# Patient Record
Sex: MALE | Race: WHITE | Employment: OTHER | URBAN - METROPOLITAN AREA
[De-identification: names, ages, dates, MRNs, and addresses within clinical notes are randomized per-mention and may not be internally consistent; named-entity substitution may affect disease eponyms.]

---

## 2017-11-02 ENCOUNTER — HOSPITAL ENCOUNTER (OUTPATIENT)
Dept: MRI IMAGING | Age: 72
Discharge: HOME | End: 2017-11-02
Attending: UROLOGY
Payer: MEDICARE

## 2017-11-02 VITALS — BODY MASS INDEX: 22.89 KG/M2 | WEIGHT: 155 LBS

## 2017-11-02 DIAGNOSIS — R97.20 ELEVATED PROSTATE SPECIFIC ANTIGEN (PSA): ICD-10-CM

## 2017-11-02 LAB — CREAT BLD-MCNC: 0.8 MG/DL (ref 0.8–1.5)

## 2017-11-02 PROCEDURE — 74011000258 HC RX REV CODE- 258: Performed by: UROLOGY

## 2017-11-02 PROCEDURE — A9577 INJ MULTIHANCE: HCPCS | Performed by: UROLOGY

## 2017-11-02 PROCEDURE — 82565 ASSAY OF CREATININE: CPT

## 2017-11-02 PROCEDURE — 72197 MRI PELVIS W/O & W/DYE: CPT

## 2017-11-02 PROCEDURE — 74011250636 HC RX REV CODE- 250/636: Performed by: UROLOGY

## 2017-11-02 RX ORDER — SODIUM CHLORIDE 0.9 % (FLUSH) 0.9 %
10 SYRINGE (ML) INJECTION
Status: COMPLETED | OUTPATIENT
Start: 2017-11-02 | End: 2017-11-02

## 2017-11-02 RX ADMIN — SODIUM CHLORIDE 100 ML: 900 INJECTION, SOLUTION INTRAVENOUS at 09:39

## 2017-11-02 RX ADMIN — GADOBENATE DIMEGLUMINE 10 ML: 529 INJECTION, SOLUTION INTRAVENOUS at 09:39

## 2017-11-02 RX ADMIN — Medication 10 ML: at 09:39

## 2018-02-27 PROBLEM — H81.10 BENIGN PAROXYSMAL VERTIGO: Status: ACTIVE | Noted: 2018-02-27

## 2018-02-27 PROBLEM — R42 DIZZINESS: Status: ACTIVE | Noted: 2018-02-27

## 2019-12-30 NOTE — H&P (VIEW-ONLY)
[address] [facility], [address] [facility]  [phone]    Patient Name:  Ernesto Quintanilla  YOB: 1945      Office Visit 12/30/2019    CHIEF COMPLAINT:    No chief complaint on file. HISTORY OF PRESENT ILLNESS:    Very pleasant 50-year-old gentleman referred to us for evaluation of chronic coughing as well as abnormal chest CT, patient states that he had a general work-up which revealed some abnormalities on a chest x-ray which then prompted a CT scan of the chest revealing chronic fibrotic changes in the lower lobes left more than right and bronchiectasis hence this referral.  Patient states he has lost about 4 pounds in the past year, he has a good appetite, denies any night sweats, fevers or chills. He tells me that in the 70s he had a very severe pneumonia and it was associated with hemoptysis at the time and then another pneumonia approximately 2 years ago. He denies any history of rheumatoid arthritis, chronic recurrent infections. He states that his cough is occasionally productive of sputum but usually dry. He also states that occasionally he will choke on food and drink. He does not use mineral oil and does not have a history of constipation.   Otherwise he has benign prostatic hypertrophy and no other chronic medical problems he used to smoke in the past but very briefly      Past Medical History:   Diagnosis Date    BPH (benign prostatic hypertrophy) 5/23/2014    DJD (degenerative joint disease) 5/23/2014    ED (erectile dysfunction) 5/23/2014    Elevated prostate specific antigen (PSA) 12/11/2013    Glaucoma     Osteoporosis          Problem List  Date Reviewed: 4/2/2019          Codes Class Noted    Benign paroxysmal vertigo ICD-10-CM: H81.10  ICD-9-CM: 386.11  2/27/2018        Dizziness ICD-10-CM: R42  ICD-9-CM: 780.4  2/27/2018        Sciatica of left side ICD-10-CM: M54.32  ICD-9-CM: 724.3  11/2/2016        Elevated BP ICD-10-CM: R03.0  ICD-9-CM: [address]  2016        Left hip pain ICD-10-CM: M25.552  ICD-9-CM: 719.45  10/12/2016        Chronic pain of both knees ICD-10-CM: M25.561, M25.562, G89.29  ICD-9-CM: 719.46, 338.29  10/12/2016        Essential hypertension ICD-10-CM: I10  ICD-9-CM: 401.9  10/12/2016        ED (erectile dysfunction) ICD-10-CM: N52.9  ICD-9-CM: 607.84  2014        DJD (degenerative joint disease) ICD-10-CM: M19.90  ICD-9-CM: 715.90  2014        Benign prostatic hyperplasia ICD-10-CM: N40.0  ICD-9-CM: 600.00  2014        Osteoporosis ICD-10-CM: M81.0  ICD-9-CM: 733.00  Unknown        Elevated prostate specific antigen (PSA) ICD-10-CM: R97.20  ICD-9-CM: 790.93  2013                Past Surgical History:   Procedure Laterality Date    HX OTHER SURGICAL      EXPLORATORY SCROTAL SURGERY          Social History     Socioeconomic History    Marital status:      Spouse name: Not on file    Number of children: Not on file    Years of education: Not on file    Highest education level: Not on file   Occupational History    Not on file   Social Needs    Financial resource strain: Not on file    Food insecurity:     Worry: Not on file     Inability: Not on file    Transportation needs:     Medical: Not on file     Non-medical: Not on file   Tobacco Use    Smoking status: Former Smoker     Packs/day: 0.50     Years: 7.00     Pack years: 3.50     Types: Cigarettes     Last attempt to quit: 1970     Years since quittin.0    Smokeless tobacco: Never Used   Substance and Sexual Activity    Alcohol use: No     Alcohol/week: 0.8 standard drinks     Types: 1 Glasses of wine per week     Comment: occasssionally    Drug use: No    Sexual activity: Not on file   Lifestyle    Physical activity:     Days per week: Not on file     Minutes per session: Not on file    Stress: Not on file   Relationships    Social connections:     Talks on phone: Not on file     Gets together: Not on file     Attends Latter-day service: Not on file     Active member of club or organization: Not on file     Attends meetings of clubs or organizations: Not on file     Relationship status: Not on file    Intimate partner violence:     Fear of current or ex partner: Not on file     Emotionally abused: Not on file     Physically abused: Not on file     Forced sexual activity: Not on file   Other Topics Concern    Not on file   Social History Narrative    Not on file         Family History   Problem Relation Age of Onset    Hypertension Mother          Allergies   Allergen Reactions    Duratuss [Pseudoephedrine-Guaifenesin] Unknown (comments)    Erythromycin Unknown (comments)         Current Outpatient Medications   Medication Sig    finasteride (PROPECIA) 1 mg tablet Take 1 mg by mouth daily.  tamsulosin (FLOMAX) 0.4 mg capsule Take 0.4 mg by mouth daily.  latanoprost (XALATAN) 0.005 % ophthalmic solution     travoprost (TRAVATAN Z) 0.004 % ophthalmic solution Administer 1 Drop to both eyes every evening.  cephALEXin (KEFLEX) 500 mg capsule Take 1 Cap by mouth three (3) times daily.  clotrimazole-betamethasone (LOTRISONE) topical cream Apply  to affected area two (2) times a day. For about 4-6 weeks    sildenafil citrate (VIAGRA) 100 mg tablet Take 1 Tab by mouth as needed. As needed for ED. No current facility-administered medications for this visit. Review of Systems   Constitutional: Positive for weight loss. Negative for chills, diaphoresis, fever and malaise/fatigue. HENT: Negative for congestion, ear discharge, ear pain, hearing loss, nosebleeds, sinus pain, sore throat and tinnitus. Eyes: Negative for blurred vision, pain and redness. Respiratory: Negative for cough, hemoptysis, sputum production, shortness of breath and wheezing. Cardiovascular: Negative for chest pain, palpitations, orthopnea, leg swelling and PND.    Gastrointestinal: Negative for abdominal pain, blood in stool, constipation, diarrhea, heartburn, melena, nausea and vomiting. Genitourinary: Positive for frequency. Negative for dysuria, flank pain, hematuria and urgency. Musculoskeletal: Positive for back pain. Negative for joint pain and neck pain. Skin: Negative for itching and rash. Neurological: Negative for dizziness, tremors, focal weakness, seizures and headaches. Endo/Heme/Allergies: Positive for environmental allergies. Bruises/bleeds easily. Psychiatric/Behavioral: Negative for depression, hallucinations, memory loss, substance abuse and suicidal ideas. The patient is not nervous/anxious. PHYSICAL EXAM:    Visit Vitals  /62 (BP 1 Location: Right arm, BP Patient Position: Sitting)   Pulse 76   Resp 14   Ht 5' 9\" (1.753 m)   Wt 139 lb (63 kg)   SpO2 100%   BMI 20.53 kg/m²        GENERAL APPEARANCE:   The patient is thin and in no respiratory distress. HEENT:   PERRL. Conjunctivae unremarkable. Nasal mucosa is without epistaxis, exudate, or polyps. Gums and dentition are unremarkable. There is no oropharyngeal narrowing. TMs are clear. NECK/LYMPHATIC:   Symmetrical with no elevation of jugular venous pulsation. Trachea midline. No thyroid enlargement. No cervical adenopathy. LUNGS:   Normal respiratory effort with symmetrical lung expansion. Breath sounds clear to auscultation bilaterally with no rhonchi wheezing or crackles. HEART:   There is a regular rate and rhythm. No murmur, rub, or gallop. There is no edema in the lower extremities. ABDOMEN:   Soft and non-tender. No hepatosplenomegaly. Bowel sounds are normal.     SKIN:   There are no rashes, cyanosis, jaundice, or ecchymosis present. EXTREMITIES:   The extremities are unremarkable without clubbing, cyanosis, joint inflammation, degenerative, or ischemic change. MUSCULOSKELETAL:   There is no abnormal tone, muscle atrophy, or abnormal movement present.    NEURO:   The patient is alert and oriented to person, place, and time. Memory appears intact and mood is normal.  No gross sensorimotor deficits are present. DIAGNOSTIC TESTS:   CT of chest:            Spirometry:  12/30/2019           ASSESSMENT:  (Medical Decision Making)       Patient Active Problem List   Diagnosis Code    Elevated prostate specific antigen (PSA) R97.20    ED (erectile dysfunction) N52.9    DJD (degenerative joint disease) M19.90    Benign prostatic hyperplasia N40.0    Osteoporosis M81.0    Left hip pain M25.552    Chronic pain of both knees M25.561, M25.562, G89.29    Essential hypertension I10    Sciatica of left side M54.32    Elevated BP R03.0    Benign paroxysmal vertigo H81.10    Dizziness R42           PLAN:  Encounter Diagnosis   Name Primary?  Abnormal chest CT Yes   The patient has what seemed to be chronic changes in both bases of his lungs posteriorly suggestive of aspiration phenomenon or previous insult to the lung related changes. He has no stigmata to suggest MAC, he does not use mineral oil, he does not have significant COPD and his PFTs are consistent more with restrictive defect rather than obstruction. He does have episodes of intermittent dysphagia per his report but those are very infrequent and he could be a silent aspirator conversely, he could have sustained lung injury in the 70s with his severe pneumonia at the time which was associated with hemoptysis and those are simply chronic changes from that event. I discussed this with the patient at length. I suggested a bronchoscopy with bronchoalveolar lavage during which we can send specimen for AFB to exclude MAC, and oil red O stain to exclude aspiration. He does not have stigmata of rheumatological disorder either. He is too old for genetically related bronchiectatic changes and the bronchiectatic changes that he has are mild and only localized to the posterior lower lobes. The left is affected more than the right.   The patient is in agreement to proceed and therefore we will schedule bronchoscopy with BAL with appropriate cultures and stains and see the patient in 3 months to discuss the results. Any questions asked were answered seemingly to her satisfaction. Orders Placed This Encounter    AMB POC SPIROMETRY    SCHEDULE PROCEDURE    REFERRAL TO PULMONARY DISEASE            Bettye Bardales MD    Dictated using voice recognition software.  Proofread, but unrecognized voice recognition errors may exist.

## 2020-01-01 ENCOUNTER — HOSPICE ADMISSION (OUTPATIENT)
Dept: HOSPICE | Facility: HOSPICE | Age: 75
End: 2020-01-01
Payer: MEDICARE

## 2020-01-01 ENCOUNTER — HOSPITAL ENCOUNTER (OUTPATIENT)
Age: 75
Setting detail: OUTPATIENT SURGERY
Discharge: HOME | End: 2020-01-09
Attending: INTERNAL MEDICINE | Admitting: INTERNAL MEDICINE
Payer: MEDICARE

## 2020-01-01 ENCOUNTER — HOSPITAL ENCOUNTER (OUTPATIENT)
Dept: PHYSICAL THERAPY | Age: 75
Discharge: HOME | End: 2020-07-02
Attending: INTERNAL MEDICINE

## 2020-01-01 ENCOUNTER — HOSPITAL ENCOUNTER (INPATIENT)
Age: 75
LOS: 3 days | End: 2020-08-07
Attending: INTERNAL MEDICINE | Admitting: INTERNAL MEDICINE

## 2020-01-01 VITALS
HEART RATE: 79 BPM | DIASTOLIC BLOOD PRESSURE: 60 MMHG | BODY MASS INDEX: 19.9 KG/M2 | WEIGHT: 139 LBS | RESPIRATION RATE: 14 BRPM | HEIGHT: 70 IN | TEMPERATURE: 97.6 F | OXYGEN SATURATION: 91 % | SYSTOLIC BLOOD PRESSURE: 124 MMHG

## 2020-01-01 VITALS
RESPIRATION RATE: 24 BRPM | SYSTOLIC BLOOD PRESSURE: 138 MMHG | HEART RATE: 78 BPM | TEMPERATURE: 97.6 F | DIASTOLIC BLOOD PRESSURE: 67 MMHG

## 2020-01-01 DIAGNOSIS — R93.89 ABNORMAL CHEST CT: ICD-10-CM

## 2020-01-01 LAB
ACID FAST STN SPEC: NEGATIVE
BACTERIA SPEC CULT: NORMAL
BRONCH. LAVAGE DIFF.,BR: NORMAL
CD3 CELLS # SPEC: 91 %
CD3+CD4+ CELLS # BLD: 79 %
CD4:CD8 RATIO, C48RBT: 15.8 RATIO
CD8, CD8BT: 5 %
EOSINOPHIL NFR BRONCH MANUAL: 0 %
FUNGUS SMEAR, RFCO1T: NORMAL
FUNGUS SPEC CULT: NORMAL
FUNGUS STAIN, 188244: NORMAL
GRAM STN SPEC: NORMAL
LYMPHOCYTES NFR BRONCH MANUAL: 78 %
MACROPHAGES NFR BRONCH MANUAL: 16 %
MYCOBACTERIUM SPEC QL CULT: NEGATIVE
NEUTROPHILS NFR BRONCH MANUAL: 6 %
SERVICE CMNT-IMP: NORMAL
SPECIMEN PREPARATION: NORMAL
SPECIMEN SOURCE: NORMAL

## 2020-01-01 PROCEDURE — 74011000250 HC RX REV CODE- 250: Performed by: NURSE PRACTITIONER

## 2020-01-01 PROCEDURE — 74011250636 HC RX REV CODE- 250/636

## 2020-01-01 PROCEDURE — 74011250637 HC RX REV CODE- 250/637: Performed by: NURSE PRACTITIONER

## 2020-01-01 PROCEDURE — 87070 CULTURE OTHR SPECIMN AEROBIC: CPT

## 2020-01-01 PROCEDURE — G0299 HHS/HOSPICE OF RN EA 15 MIN: HCPCS

## 2020-01-01 PROCEDURE — 0656 HSPC GENERAL INPATIENT

## 2020-01-01 PROCEDURE — 87116 MYCOBACTERIA CULTURE: CPT

## 2020-01-01 PROCEDURE — 89051 BODY FLUID CELL COUNT: CPT

## 2020-01-01 PROCEDURE — G0156 HHCP-SVS OF AIDE,EA 15 MIN: HCPCS

## 2020-01-01 PROCEDURE — 76040000025: Performed by: INTERNAL MEDICINE

## 2020-01-01 PROCEDURE — 77030012699 HC VLV SUC CNTRL OCOA -A: Performed by: INTERNAL MEDICINE

## 2020-01-01 PROCEDURE — 3336500001 HSPC ELECTION

## 2020-01-01 PROCEDURE — 88112 CYTOPATH CELL ENHANCE TECH: CPT

## 2020-01-01 PROCEDURE — G0155 HHCP-SVS OF CSW,EA 15 MIN: HCPCS

## 2020-01-01 PROCEDURE — 31624 DX BRONCHOSCOPE/LAVAGE: CPT | Performed by: INTERNAL MEDICINE

## 2020-01-01 PROCEDURE — 74011250636 HC RX REV CODE- 250/636: Performed by: NURSE PRACTITIONER

## 2020-01-01 PROCEDURE — 99152 MOD SED SAME PHYS/QHP 5/>YRS: CPT | Performed by: INTERNAL MEDICINE

## 2020-01-01 PROCEDURE — 88313 SPECIAL STAINS GROUP 2: CPT

## 2020-01-01 PROCEDURE — 74011000250 HC RX REV CODE- 250

## 2020-01-01 PROCEDURE — 87102 FUNGUS ISOLATION CULTURE: CPT

## 2020-01-01 PROCEDURE — 86356 MONONUCLEAR CELL ANTIGEN: CPT

## 2020-01-01 RX ORDER — NYSTATIN 100000 [USP'U]/G
POWDER TOPICAL 2 TIMES DAILY
Status: DISCONTINUED | OUTPATIENT
Start: 2020-01-01 | End: 2020-01-01 | Stop reason: HOSPADM

## 2020-01-01 RX ORDER — SODIUM CHLORIDE 0.9 % (FLUSH) 0.9 %
5-40 SYRINGE (ML) INJECTION AS NEEDED
Status: DISCONTINUED | OUTPATIENT
Start: 2020-01-01 | End: 2020-01-01 | Stop reason: HOSPADM

## 2020-01-01 RX ORDER — SODIUM CHLORIDE 9 MG/ML
25 INJECTION, SOLUTION INTRAVENOUS CONTINUOUS
Status: DISCONTINUED | OUTPATIENT
Start: 2020-01-01 | End: 2020-01-01 | Stop reason: HOSPADM

## 2020-01-01 RX ORDER — BENZONATATE 200 MG/1
200 CAPSULE ORAL
COMMUNITY
Start: 2020-01-01

## 2020-01-01 RX ORDER — ACETAMINOPHEN 650 MG/1
650 SUPPOSITORY RECTAL
COMMUNITY
Start: 2020-01-01

## 2020-01-01 RX ORDER — HALOPERIDOL 5 MG/ML
2 INJECTION INTRAMUSCULAR
Status: DISCONTINUED | OUTPATIENT
Start: 2020-01-01 | End: 2020-01-01 | Stop reason: HOSPADM

## 2020-01-01 RX ORDER — SODIUM CHLORIDE 0.9 % (FLUSH) 0.9 %
3 SYRINGE (ML) INJECTION EVERY 12 HOURS
Status: DISCONTINUED | OUTPATIENT
Start: 2020-01-01 | End: 2020-01-01 | Stop reason: HOSPADM

## 2020-01-01 RX ORDER — FINASTERIDE 5 MG/1
5 TABLET, FILM COATED ORAL DAILY
Status: ON HOLD | COMMUNITY
Start: 2020-01-01 | End: 2020-01-01

## 2020-01-01 RX ORDER — ATROPINE SULFATE 10 MG/ML
2 SOLUTION/ DROPS OPHTHALMIC
COMMUNITY
Start: 2020-01-01 | End: 2020-09-03

## 2020-01-01 RX ORDER — FACIAL-BODY WIPES
10 EACH TOPICAL AS NEEDED
Status: DISCONTINUED | OUTPATIENT
Start: 2020-01-01 | End: 2020-01-01 | Stop reason: HOSPADM

## 2020-01-01 RX ORDER — SODIUM CHLORIDE 0.9 % (FLUSH) 0.9 %
3 SYRINGE (ML) INJECTION AS NEEDED
Status: DISCONTINUED | OUTPATIENT
Start: 2020-01-01 | End: 2020-01-01 | Stop reason: HOSPADM

## 2020-01-01 RX ORDER — MORPHINE SULFATE 2 MG/ML
2 INJECTION, SOLUTION INTRAMUSCULAR; INTRAVENOUS
Status: DISCONTINUED | OUTPATIENT
Start: 2020-01-01 | End: 2020-01-01 | Stop reason: HOSPADM

## 2020-01-01 RX ORDER — LIDOCAINE HYDROCHLORIDE 40 MG/ML
SOLUTION TOPICAL AS NEEDED
Status: DISCONTINUED | OUTPATIENT
Start: 2020-01-01 | End: 2020-01-01 | Stop reason: HOSPADM

## 2020-01-01 RX ORDER — SODIUM CHLORIDE 0.9 % (FLUSH) 0.9 %
5-40 SYRINGE (ML) INJECTION EVERY 8 HOURS
Status: DISCONTINUED | OUTPATIENT
Start: 2020-01-01 | End: 2020-01-01 | Stop reason: HOSPADM

## 2020-01-01 RX ORDER — GLYCOPYRROLATE 0.2 MG/ML
0.2 INJECTION INTRAMUSCULAR; INTRAVENOUS
Status: DISCONTINUED | OUTPATIENT
Start: 2020-01-01 | End: 2020-01-01 | Stop reason: HOSPADM

## 2020-01-01 RX ORDER — HALOPERIDOL 5 MG/ML
2.5 INJECTION INTRAMUSCULAR
COMMUNITY
Start: 2020-01-01

## 2020-01-01 RX ORDER — ACETAMINOPHEN 650 MG/1
650 SUPPOSITORY RECTAL
Status: DISCONTINUED | OUTPATIENT
Start: 2020-01-01 | End: 2020-01-01 | Stop reason: HOSPADM

## 2020-01-01 RX ORDER — SODIUM CHLORIDE 0.9 % (FLUSH) 0.9 %
5-40 SYRINGE (ML) INJECTION EVERY 8 HOURS
Status: CANCELLED | OUTPATIENT
Start: 2020-01-01

## 2020-01-01 RX ORDER — FENTANYL CITRATE 50 UG/ML
50 INJECTION, SOLUTION INTRAMUSCULAR; INTRAVENOUS
Status: DISCONTINUED | OUTPATIENT
Start: 2020-01-01 | End: 2020-01-01 | Stop reason: HOSPADM

## 2020-01-01 RX ORDER — MIDAZOLAM HYDROCHLORIDE 1 MG/ML
.25-5 INJECTION, SOLUTION INTRAMUSCULAR; INTRAVENOUS
Status: CANCELLED | OUTPATIENT
Start: 2020-01-01

## 2020-01-01 RX ORDER — LORAZEPAM 2 MG/ML
1 INJECTION INTRAMUSCULAR
COMMUNITY
Start: 2020-01-01 | End: 2020-08-14

## 2020-01-01 RX ORDER — LIDOCAINE HYDROCHLORIDE 20 MG/ML
JELLY TOPICAL AS NEEDED
Status: CANCELLED | OUTPATIENT
Start: 2020-01-01

## 2020-01-01 RX ORDER — FENTANYL CITRATE 50 UG/ML
50 INJECTION, SOLUTION INTRAMUSCULAR; INTRAVENOUS
Status: CANCELLED | OUTPATIENT
Start: 2020-01-01

## 2020-01-01 RX ORDER — MIDAZOLAM HYDROCHLORIDE 1 MG/ML
.25-5 INJECTION, SOLUTION INTRAMUSCULAR; INTRAVENOUS
Status: DISCONTINUED | OUTPATIENT
Start: 2020-01-01 | End: 2020-01-01 | Stop reason: HOSPADM

## 2020-01-01 RX ORDER — LORAZEPAM 2 MG/ML
1 INJECTION INTRAMUSCULAR
Status: DISCONTINUED | OUTPATIENT
Start: 2020-01-01 | End: 2020-01-01 | Stop reason: HOSPADM

## 2020-01-01 RX ORDER — TRAMADOL HYDROCHLORIDE 50 MG/1
50 TABLET ORAL
COMMUNITY
Start: 2020-01-01

## 2020-01-01 RX ORDER — FACIAL-BODY WIPES
10 EACH TOPICAL DAILY PRN
COMMUNITY
Start: 2020-01-01 | End: 2020-09-03

## 2020-01-01 RX ORDER — LATANOPROST 50 UG/ML
1 SOLUTION/ DROPS OPHTHALMIC
COMMUNITY

## 2020-01-01 RX ORDER — SODIUM CHLORIDE 0.9 % (FLUSH) 0.9 %
5-40 SYRINGE (ML) INJECTION AS NEEDED
Status: CANCELLED | OUTPATIENT
Start: 2020-01-01

## 2020-01-01 RX ORDER — LIDOCAINE HYDROCHLORIDE 20 MG/ML
JELLY TOPICAL AS NEEDED
Status: DISCONTINUED | OUTPATIENT
Start: 2020-01-01 | End: 2020-01-01 | Stop reason: HOSPADM

## 2020-01-01 RX ORDER — LIDOCAINE HYDROCHLORIDE 40 MG/ML
SOLUTION TOPICAL AS NEEDED
Status: CANCELLED | OUTPATIENT
Start: 2020-01-01

## 2020-01-01 RX ORDER — SODIUM CHLORIDE 9 MG/ML
25 INJECTION, SOLUTION INTRAVENOUS CONTINUOUS
Status: CANCELLED | OUTPATIENT
Start: 2020-01-01

## 2020-01-01 RX ADMIN — SODIUM CHLORIDE, PRESERVATIVE FREE 3 ML: 5 INJECTION INTRAVENOUS at 08:47

## 2020-01-01 RX ADMIN — SODIUM CHLORIDE, PRESERVATIVE FREE 3 ML: 5 INJECTION INTRAVENOUS at 20:49

## 2020-01-01 RX ADMIN — HALOPERIDOL LACTATE 2 MG: 5 INJECTION, SOLUTION INTRAMUSCULAR at 13:20

## 2020-01-01 RX ADMIN — NYSTATIN: 100000 POWDER TOPICAL at 12:11

## 2020-01-01 RX ADMIN — SODIUM CHLORIDE, PRESERVATIVE FREE 3 ML: 5 INJECTION INTRAVENOUS at 17:08

## 2020-01-01 RX ADMIN — MORPHINE SULFATE 2 MG: 2 INJECTION, SOLUTION INTRAMUSCULAR; INTRAVENOUS at 17:07

## 2020-01-01 RX ADMIN — SODIUM CHLORIDE, PRESERVATIVE FREE 3 ML: 5 INJECTION INTRAVENOUS at 21:00

## 2020-01-01 RX ADMIN — HALOPERIDOL LACTATE 2 MG: 5 INJECTION, SOLUTION INTRAMUSCULAR at 17:08

## 2020-01-01 RX ADMIN — HALOPERIDOL LACTATE 2 MG: 5 INJECTION, SOLUTION INTRAMUSCULAR at 21:12

## 2020-01-01 RX ADMIN — MIDAZOLAM 2 MG: 1 INJECTION INTRAMUSCULAR; INTRAVENOUS at 13:44

## 2020-01-01 RX ADMIN — LORAZEPAM 1 MG: 2 INJECTION INTRAMUSCULAR; INTRAVENOUS at 16:19

## 2020-01-01 RX ADMIN — NYSTATIN: 100000 POWDER TOPICAL at 17:18

## 2020-01-01 RX ADMIN — SODIUM CHLORIDE 25 ML/HR: 900 INJECTION, SOLUTION INTRAVENOUS at 12:18

## 2020-01-01 RX ADMIN — SODIUM CHLORIDE, PRESERVATIVE FREE 3 ML: 5 INJECTION INTRAVENOUS at 08:17

## 2020-01-01 RX ADMIN — LIDOCAINE HYDROCHLORIDE: 40 SOLUTION TOPICAL at 13:49

## 2020-01-01 RX ADMIN — MORPHINE SULFATE 2 MG: 2 INJECTION, SOLUTION INTRAMUSCULAR; INTRAVENOUS at 08:30

## 2020-01-01 RX ADMIN — HALOPERIDOL LACTATE 2 MG: 5 INJECTION, SOLUTION INTRAMUSCULAR at 08:31

## 2020-01-01 RX ADMIN — SODIUM CHLORIDE, PRESERVATIVE FREE 3 ML: 5 INJECTION INTRAVENOUS at 13:21

## 2020-01-01 RX ADMIN — LIDOCAINE HYDROCHLORIDE: 20 JELLY TOPICAL at 13:49

## 2020-01-01 RX ADMIN — HALOPERIDOL LACTATE 2 MG: 5 INJECTION, SOLUTION INTRAMUSCULAR at 16:09

## 2020-01-01 RX ADMIN — SODIUM CHLORIDE, PRESERVATIVE FREE 3 ML: 5 INJECTION INTRAVENOUS at 22:05

## 2020-01-01 RX ADMIN — SODIUM CHLORIDE, PRESERVATIVE FREE 3 ML: 5 INJECTION INTRAVENOUS at 16:10

## 2020-01-01 RX ADMIN — FENTANYL CITRATE 50 MCG: 50 INJECTION, SOLUTION INTRAMUSCULAR; INTRAVENOUS at 13:44

## 2020-01-08 NOTE — PROGRESS NOTES
Pt called to confirm scheduled procedure tomorrow. Arrival time and required  verified. No questions at this time.

## 2020-01-09 PROBLEM — R93.89 ABNORMAL CHEST CT: Status: ACTIVE | Noted: 2020-01-01

## 2020-01-09 NOTE — PROCEDURES
PROCEDURE    Bronchoscopy with airway inspection/cleanout/BAL. INDICATION       EQUIPMENT:  Olympus T 180 Bronchhoscope. ANESTHESIA    Concious sedation with: Fentanyl 50 mcg IV; Versed 2mg IV; Lidocaine 200 mg to tracheo-bronchial tree and vocal cords. AIRWAY INSPECTION    After obtaining informed consent, using a bite block, an Olympus Q180 video bronchoscope was  introduced into the trachea through the vocal chords, without complication. RIGHT    LOCATION NORM/ABNORM DESCRIPTION   VOCAL CORDS NL    TRACHEA NL    JACQUI NL    RMSB NL    RUL NL    BI NL    RML NL    SUP SEGM RLL NL    MED BASAL NL    ANTERIOR BASAL NL    LATERAL BASAL NL    POSTERIOR BASAL NL                                              LEFT    LOCATION NORMAL/ABNORMAL TYPE   LMSB NL    GERMAINE NL    LINGULA NL    SUPERIOR DIVISION NL    SUPERIOR SEG LLL NL    KATIE-MEDIAL LLL NL    LATERAL LLL NL    POSTERIOR LLL NL      The following samples were obtained:    BAL: LLL    Samples were sent for:  Cell count with diff  CD4:8  AFB  Fungus  Routine cultures   Cytology  Oil Re O stain    The procedure was completed  without complication and the patient tolerated the procedure well. EBL:   none    Recommendations:    Await BAL results  SLP assessment and MBS.     Devona Blizzard, MD

## 2020-01-09 NOTE — DISCHARGE INSTRUCTIONS
RESPIRATORY CARE - BRONCHOSCOPY - DISCHARGE INSTRUCTIONS      You received a lot of numbing medication for your throat and nose, and you also received medication to make you sleepy during your procedure. Because of this and because the bronchoscopy may have irritated your airways, we ask that you follow these directions:    1. Do not eat or drink until  245pm .   After that, you may have what you please. You may want to try some liquids first, because your throat may be a little sore. 2. Medication may cause drowsiness for several hours, therefore:  · Do not drive or operate machinery for remainder of the day. · No alcohol today  · Do not make any important or legal decisions for 24 hours  · Do not sign any legal documents for 24 hours    3. You may cough up more mucus than usual and you may see some blood, but this is expected and should subside by the following day. 4. If severe throat irritation, coughing, or bleeding continue, call your doctor. 5.         If you run a fever greater than 102, take tylenol and motrin then call [facility] at [phone]. 6.         Dr. Mony Shea has asked that you:                A. Call the doctor's office at [phone] for any questions or problems that may occur. [facility] will be in touch with you regarding follow up from today's procedure. Discharge Medications:    Any additional instructions:  Resume all medication as before procedure using caution with any pain medication.   Instructions given to Orozco Andes and other family members

## 2020-01-09 NOTE — INTERVAL H&P NOTE
H&P Update:  Teofilo Mcintosh was seen and examined. History and physical has been reviewed. The patient has been examined.  There have been no significant clinical changes since the completion of the originally dated History and Physical.

## 2020-08-04 PROBLEM — J96.91 RESPIRATORY FAILURE WITH HYPOXIA AND HYPERCAPNIA (HCC): Status: ACTIVE | Noted: 2020-01-01

## 2020-08-04 PROBLEM — J96.92 RESPIRATORY FAILURE WITH HYPOXIA AND HYPERCAPNIA (HCC): Status: ACTIVE | Noted: 2020-01-01

## 2020-08-04 NOTE — H&P
History and Physical    Patient: Monique Gray MRN: [id number]  SSN: [id number]    YOB: 1945  Age: 75 y.o. Sex: male      Subjective:      Monique Gray is a 75 y.o. male who has a hospice diagnosis of hypoxic and hypercapnic respiratory failure for management of pain, dyspnea and agitation. Hospice associated diagnoses are sterile empyema, aspiration pneumonia, dysphagia, protein calorie malnutrition, hypoalbuminemia, metabolic encephalopathy, mucous plugging, chest tubes x 3. Non associated diagnoses are BPH, hypertension and osteoarthritis. He presented to his family physician on 7/13/20 and was found to have aspiration pneumonia. He was treated with a 7 day course of Levaquin but was not feeling better. He presented to the ER on 7/20/20 with fever, cough, malaise and worsening dyspnea. Chest xray revealed a large left pleural effusion. CT scan revealed a large loculated pleural effusion, compressive atelectasis in the left lung with consolidation, mild mediastinal adenopathy, right upper lobe with ground glass opacities and small pericardial effusion. Covid-19 testing was negative. Patient was started on antibiotics with some improvement. On 7/23, VATS procedure with decortication of the left lung was performed and 3 chest tubes were placed. He was taken for bronchoscopy on 7/24 and 7/25 with removal of mucous plugs. He was extubated and chest tubes were removed. He developed a fever on 7/29 with elevated WBC and antibiotics were restarted. He continued to decline and was placed on BiPaP therapy due to increasing oxygen requirements. His condition continued to worsen with obtundation and dependence on BiPaP therapy. Ventilatory support with trach placement and PEG placement was discussed with his spouse, Fredderick Leyden, and she has elected to forgo further medical treatment and pursue comfort measures with hospice care. Without further treatment, his life expectancy is less than 7 days. Patient admitted GIP with Acute Hypoxic and Hypercapnic Respiratory Failure for management of pain, dyspnea and agitation. Past Medical History:   Diagnosis Date    Adverse effect of anesthesia     Benign paroxysmal vertigo 2018    BPH (benign prostatic hypertrophy) 2014    DJD (degenerative joint disease) 2014    Pt denies.  ED (erectile dysfunction) 2014    Elevated prostate specific antigen (PSA) 2013    Glaucoma     Ill-defined condition     \"Bronchial drainage\"    Left hip pain 10/12/2016    Osteoporosis     Psoriasis     Sciatica of left side 2016     Past Surgical History:   Procedure Laterality Date    HX OTHER SURGICAL      EXPLORATORY SCROTAL SURGERY       Family History   Problem Relation Age of Onset    Hypertension Mother      Social History     Tobacco Use    Smoking status: Former Smoker     Packs/day: 0.50     Years: 7.00     Pack years: 3.50     Types: Cigarettes     Last attempt to quit: 1970     Years since quittin.6    Smokeless tobacco: Never Used   Substance Use Topics    Alcohol use: No     Alcohol/week: 0.8 standard drinks     Types: 1 Glasses of wine per week     Comment: occasssionally      Prior to Admission medications    Medication Sig Start Date End Date Taking? Authorizing Provider   acetaminophen (TYLENOL) 650 mg suppository Insert 650 mg into rectum every six (6) hours as needed for Mild Pain or Fever. 20  Yes Provider, Historical   atropine 1 % ophthalmic solution 2 Drops by SubLINGual route every one (1) hour as needed for Secretions. 8/4/20 9/3/20 Yes Provider, Historical   bisacodyL (DULCOLAX) 10 mg supp Insert 10 mg into rectum daily as needed for Constipation. 8/4/20 9/3/20 Yes Provider, Historical   haloperidol lactate (HALDOL) 5 mg/mL injection 2.5 mg by IntraVENous route every six (6) hours as needed for Agitation.  20  Yes Provider, Historical   LORazepam (ATIVAN) 2 mg/mL injection 1 mg by IntraVENous route every one (1) hour as needed for Anxiety. 8/4/20 8/14/20 Yes Provider, Historical   benzonatate (TESSALON) 200 mg capsule Take 200 mg by mouth three (3) times daily as needed for Cough. 7/15/20   Provider, Historical   traMADoL (ULTRAM) 50 mg tablet Take 50 mg by mouth four (4) times daily as needed for Pain. 7/15/20   Provider, Historical   latanoprost (XALATAN) 0.005 % ophthalmic solution Administer 1 Drop to both eyes nightly. Provider, Historical   finasteride (PROSCAR) 5 mg tablet Take 1 Tab by mouth daily. 3/2/20   Elmo Alvarez MD   tamsulosin [facility]) 0.4 mg capsule Take 1 Cap by mouth daily. 2/25/20   Elmo Alvarez MD   travoprost (TRAVATAN Z) 0.004 % ophthalmic solution Administer 1 Drop to both eyes every evening. Provider, Historical        Allergies   Allergen Reactions    Duratuss [Pseudoephedrine-Guaifenesin] Unknown (comments)     Pt denies reaction/ allergy (2020)    Erythromycin Unknown (comments)     Pt denies reaction/ allergy (2020)       Review of Systems:  Review of systems not obtained due to patient factors. Objective:      08/05/20 1625 08/05/20 1700 08/06/20 0557   BP: 143/66  138/68   Pulse: 74  83   Resp: 16 28 28   Temp: 98.1 °F (36.7 °C)  (!) 96.6 °F (35.9 °C)        Physical Exam:  GENERAL: fatigued, cooperative, mild distress, appears stated age, pale, agitated with stimuli. LUNG: Coarse breath sounds with rhonchi. Diminished in the bases. Labored respirations. HEART: regular rate and rhythm  ABDOMEN: soft, non-tender. Bowel sounds absent  : Farias catheter with eamon urine. EXTREMITIES:  extremities with no cyanosis. Moderate lower ext. edema  SKIN: Pale. Warm to touch. RIght groin fungal rash. Peripheral IV present in left forearm and right forearm. NEUROLOGIC: Responds to stimuli with agitation. Nonverbal. Generalized weakness with no lateralizing deficit noted. Bedbound.      Assessment:     Hospital Problems  Date Reviewed: 8/7/2020 Codes Class Noted POA    * (Principal) Respiratory failure with hypoxia and hypercapnia (HCC) ICD-10-CM: J96.91, J96.92  ICD-9-CM: 518.81  8/4/2020 Unknown        Metabolic encephalopathy PHA-87-YY: G93.41  ICD-9-CM: 348.31  7/27/2020 Yes        Oropharyngeal dysphagia ICD-10-CM: R13.12  ICD-9-CM: 787.22  7/27/2020 Yes        Pneumonia due to infectious organism ICD-10-CM: J18.9  ICD-9-CM: 848  7/27/2020 Yes        Hyponatremia ICD-10-CM: E87.1  ICD-9-CM: 276.1  7/21/2020 Yes        Leukocytosis ICD-10-CM: D72.829  ICD-9-CM: 288.60  7/21/2020 Yes        Parapneumonic effusion ICD-10-CM: J18.9, J91.8  ICD-9-CM: 511.89  7/21/2020 Yes        Silent aspiration ICD-10-CM: T17.900A  ICD-9-CM: 934.9  7/21/2020 Yes        Weakness ICD-10-CM: R53.1  ICD-9-CM: 780.79  7/21/2020 Yes              Plan:     Current Facility-Administered Medications   Medication Dose Route Frequency    nystatin (MYCOSTATIN) 100,000 unit/gram powder   Topical BID    sodium chloride (NS) flush 3 mL  3 mL IntraVENous Q12H    sodium chloride (NS) flush 3 mL  3 mL IntraVENous PRN    haloperidol lactate (HALDOL) injection 2 mg  2 mg SubCUTAneous Q1H PRN    Or    haloperidol lactate (HALDOL) injection 2 mg  2 mg IntraVENous Q1H PRN    acetaminophen (TYLENOL) suppository 650 mg  650 mg Rectal Q3H PRN    bisacodyL (DULCOLAX) suppository 10 mg  10 mg Rectal PRN    haloperidol lactate (HALDOL) injection 2 mg  2 mg SubCUTAneous Q1H PRN    Or    haloperidol lactate (HALDOL) injection 2 mg  2 mg IntraVENous Q1H PRN    glycopyrrolate (ROBINUL) injection 0.2 mg  0.2 mg IntraVENous Q4H PRN    morphine injection 2 mg  2 mg SubCUTAneous Q20MIN PRN    Or    morphine injection 2 mg  2 mg IntraVENous Q20MIN PRN    LORazepam (ATIVAN) injection 1 mg  1 mg IntraVENous Q4H PRN       8/4: (Mini) Admitted GIP with Acute Hypoxic and Hypercapnic Respiratory Failure for management of pain, dyspnea and agitation.     1. Pain: Morphine 2mg IV/SQ Q20 minutes as needed. 2. Dyspnea: Morphine 2mg IV/SQ Q20 minutes as needed. Glycopyrrolate 0.2mg q4 prn secretions. Oxygen at 10 L/min via oxymizer. 3. Agitation: Haloperidol 2mg IV/SQ Q1 hour prn and Lorazepam 1mg IV/IM q4 hours prn.    4. Family/Pt Support: Family at bedside during exam. Medications and plan of care discussed with nursing staff and family. Will continue to monitor for symptoms and adjust medications as needed to maintain patient comfort. PPS 10%. Case discussed with Dr. Lior Hackett and in [facility] meeting today. No changes in plan of care.        Signed By: Sapphire Morales NP     August 6, 2020

## 2020-08-04 NOTE — [facility] IDG NURSE NOTES
Patient: Katarina Kirkland    Date: 08/04/20  Time: 15:30 PM    [facility] Nurse Notes  2815- The patient arrived from [facility] via ambulance for GIP. Diagnosis of Acute Hypoxic and Hypercapnic Respiratory Failure. Symptoms of respiratory distress, agitation / anxiety and pain will be managed with IV or subcutaneous medications. The patient arrived with BiPap for transportation and was changed to 10 L of oxygen via oxymizer on arrival. Patient tolerated well. Patient has an IV access in left and right arm. Dressing to both are dry and intact. Farias catheter is draining clear yellow urine. The patient has large green incontinent stool on arrival. Cleaned and creamed alexander area. Skin is intact. He has a dressing to left upper chest from VAT. Dressing is dry and intact. The patient did not respond to the transfer from the stretcher to the bed. Patient's wife, Radha Pheba, is at the bedside. Admission is completed and initial General Hospice care plan initiated which includes spiritual, psychosocial and bereavement. Patient has no immediate needs. IDG team made aware of plan of care.            Signed by: Demetrius Henning RN

## 2020-08-04 NOTE — PROGRESS NOTES
1530- The patient arrived from [facility] via ambulance for GIP. Diagnosis of Acute Hypoxic and Hypercapnic Respiratory Failure. Symptoms of respiratory distress, agitation / anxiety and pain will be managed with IV or subcutaneous medications. The patient arrived with BiPap for transportation and was changed to 10 L of oxygen via oxymizer on arrival. Patient tolerated well. Patient has an IV access in left and right arm. Dressing to both are dry and intact. Farias catheter is draining clear yellow urine. The patient has large green incontinent stool on arrival. Cleaned and creamed alexander area. Skin is intact. He has a dressing to left upper chest from VAT. Dressing is dry and intact. The patient did not respond to the transfer from the stretcher to the bed. Patient's wife, Zay Goetz, is at the bedside. 1800- The patient has required no prn medications since arrival. He remains on the oxymizer at 10 L . He was turned and repositioned times one in the bed and has not responded since arrival. His wife remains at the bedside.      Report given to Fatuma Monroe RN

## 2020-08-05 NOTE — PROGRESS NOTES
KRISH was asked to speak to Guadalupe Benavide[address] Dr. She had questions about organ donation specifically the corneas. KRISH advised her that I would gather some information on the subject for her and contact [facility] for more information. [address] said she was interested in simple cremation for him as well. KRISH provided her on resources for that as well.

## 2020-08-05 NOTE — PROGRESS NOTES
184  Received report from Denise Chawla RN. Pt Id by name and . 1935  Pt lying in bed. Eyes closed. No facial grimace. Flacc =0-1. Resp irreg non labored on 10 L oxymizer. Lungs with bilateral rales. HR reg. BS hypo. Edema 2+ noted in BLE. Farias cath draining clear yellow urine. SR up x 2. Bed low/locked. Call light with in reach. Family at the bedside. 2100  Pt calm. Eyes opened. Non verbal at this time. Flacc =0-1. Resp non labored on 10 L oxymizer. Peripheral line flushed with 3ml ns. Flushed well. Dressing clean/dry/intact. SR up x 2. Bed low/locked. Call light with in reach. Family at the bedside. 2317  Pt resting comfortably. No s/sx of distress. Eyes closed. No moans or facial grimace. Flacc =0-1. Resp non labored on 10 L oxymizer. SR up x 2. Bed low/locked. Call light with in reach. Family at the bedside. 0106  Pt resting comfortably with eyes closed. No facial grimace. Flacc =0-1. Resp non labored on 10 L oxymizer. SR up x 2. Bed low/locked. Call light with in reach. Family at the bedside. 0310  Pt lying quietly with eyes opened. Non verbal. No facial grimace. Flacc =0-1. Resp non labored on 10 L oxymizer. SR up x 2. Bed low/locked. Call light with in reach. Family at the bedside. 0525  Pt resting comfortably with eyes opened. No facial grimace. Flacc =0-1. Resp non labored on 10 L oxymizer. Pt repositioned with CNA. SR up x 2. Bed low/locked. Call light with in reach. Family at the bedside. Report given to Denise Chawla RN.

## 2020-08-05 NOTE — PROGRESS NOTES
Demographics     Information provided by: Spouse Cindy      Name:     \"Armani\" Jose Rangel                                                              Level of Care  GIP [x] Routine  [] Respite   []           From the in home program Yes [] No [x]  Team                                                        Diagnosis  Acute Hypoxic Respiratory Failure       Insurance    Medicare [x]   Medicaid  []  [employer]  []      Other []              Social  Pt was living independently until this episode. He was out walking their dogs daily and had no troubles with his health per his wife. She is very surprised at his decline so quickly. Pt was an outdoors man he loved fly fishing and taking care of their 4 horses and 2 dogs. He and his spouse traveled to [address] 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 She said she went back to teaching because they asked her to come back to teaching. 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          [x]   Single []     []    []    Spouse name Niurka Price   Length of marriage 41 years their anniversary was last week   Children:  No children \"we are animal people\"             Community Resources Used in the Home prior to admission Yes []  No [x]      Financial Concerns :                     Natasha Application Needed   Yes []  No [x]     Medicaid application needed Yes []  No [x]                                   IFA Form Complete  Yes []   No [x]    Discharge Plans:   Pt is needing symptom management at this time it is unclear if he will be managed enough to be discharged home with hospice       Work History : Pt worked as a  for an [address] [place] [address]   Retired [x] [employer] [] Part-time [] Disabled []        [address]   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 [address] Final Arrangements: Spouse did not want to tlak about this infront of the pt who was awake. LMSW encouraged her to just let the nurses know of her choice. Medical History and/or Narrative copied from the patients chart from the Admitting Physician or Rn:  The patient arrived from [facility] via ambulance for GIP. Diagnosis of Acute Hypoxic and Hypercapnic Respiratory Failure. Symptoms of respiratory distress, agitation / anxiety and pain will be managed with IV or subcutaneous medications. The patient arrived with BiPap for transportation and was changed to 10 L of oxygen via oxymizer on arrival. Patient tolerated well. Patient has an IV access in left and right arm. Dressing to both are dry and intact. Farias catheter is draining clear yellow urine. The patient has large green incontinent stool on arrival. Cleaned and creamed alexander area. Skin is intact. He has a dressing to left upper chest from VAT. Dressing is dry and intact. The patient did not respond to the transfer from the stretcher to the bed. Patient's wife, David Whatley, is at the bedside. ? Admission is completed and initial General Hospice care plan initiated which includes spiritual, psychosocial and bereavement. Patient has no immediate needs. IDG team made aware of plan of care. ?      Mental Health History  No Mental health needs       Volunteer discussion: Yes [x]    No []The volunteer program has been suspended due to the Covid-19 virus. LMSW will ensure the pt and families receive their comfort bags. LMSW provided the family the comfort bas yesterday and went through he bag with the spouse .      Goals of care for the patient and family: Emotional support and assisting the spouse with choosing a  home       Coping and Bereavement:  The spouse Neeru Frey is very surprised at his decline. She said that he has not had any health issues and was fully independent before this hospitalization. She is having a hard time with the suddenness of his illness and the fact that the doctors can not explain how or why it happened.                        Was a Referral made to Bereavement  Yes [x] No []

## 2020-08-05 NOTE — PROGRESS NOTES
Problem: Anxiety/Agitation  Goal: Verbalize and demonstrate ability to manage anxiety  Description: The patient/family/caregiver will verbalize and demonstrate ability to manage the patient's anxiety throughout hospice care.   Outcome: Progressing Towards Goal

## 2020-08-05 NOTE — PROGRESS NOTES
Problem: Psychosocial General  Goal: Patient/family is receiving emotional support  Description: Problem:  Anticipatory Grief    Goals:   Patient will share feelings regarding terminal conditions   Patient will participate in life review  Family will discuss feelings of anticipatory grief and develop coping skills      Interventions:   Allow expression of feelings and losses  Encourage communication and acceptance  Instruct in stages of grief  Encourage life review  Offer support through grief process  Referral for Bereavement - completed 8/5/20       Outcome: Progressing Towards Goal

## 2020-08-05 NOTE — PROGRESS NOTES
5179-Report received from Rafa Garay RN. Patient identified by name and . Patient resting quietly in bed with eyes closed. No signs or symptoms of distress, pain, agitation/anxiety, or SOB noted at present. Oxygen in use with use of oximyzer at 10L NC. Farias catheter in place draining eamon colored urine. Bed  locked and in lowest position, side rails up x 2, call bell within reach, tab alarm in place. Wife at bedside. No immediate needs voiced. -Patient resting in bed with eyes closed. Respirations unlabored with no signs or symptoms of distress, pain, agitation, or discomfort noted. Wife remain at side. No immediate needs voiced. FLACC 0.    2230-Nurse and CNA entered patient's room for repositioning. Wife remain at side and voiced patient had only been sleeping. Incontinence care and repositioning completed. Bowel movement noted x 1. Patient able to open eyes, but nonverbal. Patient admitted to [facility] on 2020 with a terminal diagnosis of respiratory failure with hypoxia and hypercapnia. Patient is GIP level of care for symptom management of pain, dyspnea, and agitation. Patient is alert at times, but unable to voice any needs. Patient requires skilled nursing assessment, ongoing monitoring, and reevaluation of medication regimen to achieve optimum level of comfort. FLACC 0.    0019-Patient continues to rest in bed with eyes closed with no signs or symptoms of distress, pain, agitation, or discomfort noted. Wife remain at side. No immediate needs voiced. FLACC 0.    0220-Patient resting in bed with eyes closed. No signs or symptoms of distress, pain, agitation, or discomfort noted. Wife remain at side. FLACC 0.    0415-Patient resting in bed with eyes opened. Respirations unlabored with no signs or symptoms of distress, pain, agitation, or discomfort noted. Wife remain at side. FLACC 0.    0607-Patient continues to rest in bed with eyes closed.  No signs or symptoms of distress, pain, agitation, or discomfort noted. FLACC 0.     Report given to Brit Mendoza RN

## 2020-08-05 NOTE — PROGRESS NOTES
Report received from [address] RN  visual identification made, assumed care of pt. Pt resting quietly with eyes closed, no agitation or restlessness, no grimacing or groaning. Pt respirations unlabored. oxymizer on at 10Liters Tab alert in place, rails up x 2, bed in lowest position, safety maintained. FLACC 0. Accompanied. Visitor states she has to go home and feed the animals. 1716 oximyzer off, pt restless, reaching for nurse's hands. RR 30, administered morphine for dyspnea and haldol for agitation. Held pt hands and tried to reassure him, explained would be listening to heart and lungs, heart sounds rapid, lung sounds diminished, hypoactive bowel sounds ruelas draining clear yellow urine, extremities warm with palpable pulses. Lower extremities 1+ edema. Pt has dressing to left lateral chest,clean dry and intact,.     1030 pt resting quietly, no grimacing, groaning or agitation or dyspnea    1115 pt incontinent of large green loose stool, incontinent care completed. With two assist turned on left side and positioned with pilllows    1211 pt resting quietly, no grimacing, groaning or agitation wife sitting out on patio    1433 pt awake, attempting to speak, wife at bedside, states she has some questions. She wonders how pt can be so alert today when she was told he would crash quickly, and now she doesn't know what to think, she was asking about food and drink and how long he can go without fluid. She states she just can't figure out how he got so sick so fast.     1510 spoke with NP Alona Adam about Cindy's questions, looked up pt's history. [facility] in bed with pt. She states pt pulled her down into bed, pt holding her hand. 1629 pt removing oxymizer, placed back on pt, removed extra blankets due to pt skin hot.      [address] left to care for pets at home, pt restless, agitated, RR 28, administered haldol and morphine IVP for agitation and dyspnea     1755 pt resting quietly, no agitation, oxymizer in place. RR 24 and unlabored.     Report given to on coming nurse Karl Vidal RN

## 2020-08-05 NOTE — PROGRESS NOTES
Problem: Falls - Risk of  Goal: *Absence of Falls  Description: Document [facility] Fall Risk and appropriate interventions in the flowsheet. Outcome: Progressing Towards Goal  Note: Fall Risk Interventions:            Medication Interventions: Bed/chair exit alarm, Evaluate medications/consider consulting pharmacy    Elimination Interventions: Bed/chair exit alarm              Problem: Pressure Injury - Risk of  Goal: *Prevention of pressure injury  Description: Document Dawson Scale and appropriate interventions in the flowsheet.   Outcome: Progressing Towards Goal  Note: Pressure Injury Interventions:  Sensory Interventions: Assess changes in LOC, Assess need for specialty bed, Check visual cues for pain, Float heels, Keep linens dry and wrinkle-free, Minimize linen layers    Moisture Interventions: Absorbent underpads, Internal/External urinary devices, Limit adult briefs, Maintain skin hydration (lotion/cream), Minimize layers, Moisture barrier    Activity Interventions: Assess need for specialty bed    Mobility Interventions: Assess need for specialty bed, Float heels    Nutrition Interventions: (NPO)    Friction and Shear Interventions: Apply protective barrier, creams and emollients, Lift sheet, Minimize layers

## 2020-08-05 NOTE — [facility] IDG MASTER NOTE
[facility]osp[facility] Interdisciplinary [facility] Collabor[facility]  Date: 08/05/20  Time: 8:46 AM    ___________________    Patient: Janes Salazar  Coverage Information:     Payor: [facility] MEDICARE     Plan: [facility] MEDICARE PART A AND B     Subscriber ID: [id number]     Phone Number:   MRN: [id number]  Current Benefit Period: Benefit Period 1  Start Date: 8/4/2020  End Date: 11/1/2020      Medical Director: Simone Trejo MD   Hospice Attending Provider: Pooja Da Silva MD  [address] [place] [address] [address] [address]  Phone: [phone]  Fax: [phone]    Level of Care: General Inpatient Care      ___________________    Diagnoses: There were no encounter diagnoses. Current Medications:    Current Facility-Administered Medications:     sodium chloride (NS) flush 3 mL, 3 mL, IntraVENous, Q12H, Zoe Anderson NP, 3 mL at 08/05/20 0817    sodium chloride (NS) flush 3 mL, 3 mL, IntraVENous, PRN, Zoe Anderson NP    haloperidol lactate (HALDOL) injection 2 mg, 2 mg, SubCUTAneous, Q1H PRN **OR** haloperidol lactate (HALDOL) injection 2 mg, 2 mg, IntraVENous, Q1H PRN, Zoe Anderson NP    acetaminophen (TYLENOL) suppository 650 mg, 650 mg, Rectal, Q3H PRN, Zoe Anderson NP    bisacodyL (DULCOLAX) suppository 10 mg, 10 mg, Rectal, PRN, Zoe Anderson NP    haloperidol lactate (HALDOL) injection 2 mg, 2 mg, SubCUTAneous, Q1H PRN **OR** haloperidol lactate (HALDOL) injection 2 mg, 2 mg, IntraVENous, Q1H PRN, Zoe Anderson NP, 2 mg at 08/05/20 0831    glycopyrrolate (ROBINUL) injection 0.2 mg, 0.2 mg, IntraVENous, Q4H PRN, Zoe Anderson NP    morphine injection 2 mg, 2 mg, SubCUTAneous, Q20MIN PRN **OR** morphine injection 2 mg, 2 mg, IntraVENous, Q20MIN PRN, Zoe Anderson NP, 2 mg at 08/05/20 0830    LORazepam (ATIVAN) injection 1 mg, 1 mg, IntraVENous, Q4H PRN, Zoe Anderson NP    Orders:  Orders Placed This Encounter    IP CONSULT TO SPIRITUAL CARE Once on week one, then PRN.  For [facility] patients only. For contracted patients, primary hospice will continue to manage spiritual care needs     Once on week one, then PRN. For [facility] patients only. For contracted patients, primary hospice will continue to manage spiritual care needs     Standing Status:   Standing     Number of Occurrences:   1     Order Specific Question:   Reason for Consult: Answer:   Spiritual crisis intervention or per patient or caregiver request    DIET PLEASURE     Standing Status:   Standing     Number of Occurrences:   1    VITAL SIGNS     Standing Status:   Standing     Number of Occurrences:   1    VITAL SIGNS     Standing Status:   Standing     Number of Occurrences:   1    NURSING-MISCELLANEOUS: comfort measures Enter comfort measures above. CONTINUOUS     Enter comfort measures above. Standing Status:   Standing     Number of Occurrences:   1     Order Specific Question:   Description of Order:     Answer:   comfort measures    PLAZA CATHETER, CARE     1. Plaza Catheter care every shift and PRN  2. Notify Physician of Plaza Catheter leakage, occlusion, gross adherent sediment or accidental removal  3. Change Plaza 30 days after insertion. 4. May flush catheter prn leakage or gross adherent sediment or mucus. Standing Status:   Standing     Number of Occurrences:   1    BLADDER CHECKS     May scan bladder PRN for urinary retention and or patient discomfort     Standing Status:   Standing     Number of Occurrences:   1    NURSING ASSESSMENT:  SPECIFY Assess for GIP, routine, or respite level of care. Q SHIFT Routine     Standing Status:   Standing     Number of Occurrences:   1     Order Specific Question:   Please describe the test or procedure you would like to order. Answer:   Assess for GIP, routine, or respite level of care.  PAIN ASSESSMENT Pain and Symptoms: Assess ever 4 hours and PRN, for GIP level of care.  PRN Routine     Standing Status:   Standing     Number of Occurrences:   1 Order Specific Question:   Please describe the test or procedure you would like to order. Answer:   Pain and Symptoms: Assess ever 4 hours and PRN, for GIP level of care.  BEDREST, COMPLETE     Standing Status:   Standing     Number of Occurrences:   1    NURSING-MISCELLANEOUS: admit 8/4: (Mini) Admitted GIP with Acute Hypoxic and Hypercapnic Respiratory Failure for management of pain, dyspnea and agitation. Associated Dx: Empyema Sterile, Aspiration Pneumonia, Dysphagia, Protein Calorie Malnutri. .. 8/4: (Mini) Admitted GIP with Acute Hypoxic and Hypercapnic Respiratory Failure for management of pain, dyspnea and agitation. Associated Dx: Empyema Sterile, Aspiration Pneumonia, Dysphagia, Protein Calorie Malnutrition, Hypoalbuminemia, Metabolic Encephalopathy, Mucous Plugging, Non-traumatic Intracerebral Hemorrhage, Anemia of Chronic Disease, Acute L Ventricular Systolic Dysfunction CHF, Chest tube   Non-Associated Dx: BPH, HTN, Osteoarthritis     Standing Status:   Standing     Number of Occurrences:   1     Order Specific Question:   Description of Order:     Answer:   admit    DO NOT RESUSCITATE     Standing Status:   Standing     Number of Occurrences:   1    OXYGEN CANNULA Liters per minute: 10; Indications for O2 therapy: RESPIRATORY DISTRESS PRN Routine     Please give via oxymizer. Standing Status:   Standing     Number of Occurrences:   24068     Order Specific Question:   Liters per minute:      Answer:   10     Order Specific Question:   Indications for O2 therapy     Answer:   RESPIRATORY DISTRESS    sodium chloride (NS) flush 3 mL    sodium chloride (NS) flush 3 mL    OR Linked Order Group     haloperidol lactate (HALDOL) injection 2 mg     haloperidol lactate (HALDOL) injection 2 mg    acetaminophen (TYLENOL) suppository 650 mg    bisacodyL (DULCOLAX) suppository 10 mg    OR Linked Order Group     haloperidol lactate (HALDOL) injection 2 mg     haloperidol lactate (HALDOL) injection 2 mg    glycopyrrolate (ROBINUL) injection 0.2 mg    OR Linked Order Group     morphine injection 2 mg     morphine injection 2 mg    LORazepam (ATIVAN) injection 1 mg    INITIAL PHYSICIAN ORDER: HOSPICE Level Of Care: General Inpatient; Reason for Admission: 8/4: (Mini) Admitted GIP with Acute Hypoxic and Hypercapnic Respiratory Failure for management of pain, dyspnea and agitation. Standing Status:   Standing     Number of Occurrences:   1     Order Specific Question:   Status     Answer:   Hospice     Order Specific Question:   Level Of Care     Answer:   General Inpatient     Order Specific Question:   Reason for Admission     Answer:   8/4: (Mini) Admitted GIP with Acute Hypoxic and Hypercapnic Respiratory Failure for management of pain, dyspnea and agitation. Order Specific Question:   Inpatient Hospitalization Certified Necessary for the Following Reasons     Answer:   3. Patient receiving treatment that can only be provided in an inpatient setting (further clarification in H&P documentation)     Order Specific Question:   Admitting Diagnosis     Answer:   Respiratory failure with hypoxia and hypercapnia [facility]) [[id number]]     Order Specific Question:   Terminal Prognosis Diagnosis(es)     Answer:   Respiratory failure with hypoxia and hypercapnia [facility]) [[id number]]     Order Specific Question:   Admitting Physician     Answer:   Loly Judd [[id number]]     Order Specific Question:   Attending Physician     Answer:   Nelsy Giron     Order Specific Question:   Discharge Plan:     Answer: Other (Specify)    IP CONSULT TO SOCIAL WORK     Once on week one, then PRN for Psychosocial crisis intervention or per patient or caregiver request.  For [facility] patients only. For contracted patients, primary hospice will continue to manage social work needs.      Standing Status:   Standing     Number of Occurrences:   1     Order Specific Question:   Reason for Consult: Answer: Once on week one, then PRN for Psychosocial crisis intervention or per patient or caregiver request.       Allergies: Allergies   Allergen Reactions    Duratuss [Pseudoephedrine-Guaifenesin] Unknown (comments)     Pt denies reaction/ allergy (2020)    Erythromycin Unknown (comments)     Pt denies reaction/ allergy (2020)       Care Plan:  Multidisciplinary Problems (Active)     Problem: Anticipatory Grief     Dates: Start: 08/05/20       Disciplines: Interdisciplinary    Goal: Explore reactions to and verbalize acceptance of impending loss     Dates: Start: 08/05/20   Expected End: 08/15/20       Description: Patient/family/caregiver will explore reactions to and verbalize acceptance of impending loss. Disciplines: Interdisciplinary    Intervention: Assess grief responses     Dates: Start: 08/05/20             Intervention: Assist with grief counseling     Dates: Start: 08/05/20       Description:  to assist.          Intervention: Kylah Jasso on stages of grief     Dates: Start: 08/05/20       Description: Instruct patient/caregiver on stages of grief. Intervention: Offer grief support group     Dates: Start: 08/05/20       Description: Patient/family/caregiver will explore reactions to and verbalize acceptance of impending loss. Problem: Anxiety/Agitation     Dates: Start: 08/05/20       Disciplines: Interdisciplinary    Goal: Verbalize and demonstrate ability to manage anxiety     Dates: Start: 08/05/20   Expected End: 08/15/20       Description: The patient/family/caregiver will verbalize and demonstrate ability to manage the patient's anxiety throughout hospice care. Disciplines: Interdisciplinary    Intervention: Assess for anxiety/agitation     Dates: Start: 08/05/20       Description: Assess for signs and symptoms of anxiety and agitation.           Intervention: Instruction strategies to reduce anxiety/agitation     Dates: Start: 08/05/20 Description: Instruct patient/caregiver on strategies to reduce anxiety/agitation. Problem: Coping and Emotional Distress     Dates: Start: 08/05/20       Disciplines: Interdisciplinary    Goal: Demonstrate acceptance of terminal illness and understanding of disease progression     Dates: Start: 08/05/20   Expected End: 08/15/20       Description: Patient/family/caregiver will demonstrate acceptance of terminal disease and understanding of disease progression while employing appropriate coping mechanisms. Disciplines: Interdisciplinary    Intervention: Assess for alteration in coping     Dates: Start: 08/05/20             Intervention: Assess for signs/symptoms of emotional distress     Dates: Start: 08/05/20             Intervention: Instruct on effective coping skills     Dates: Start: 08/05/20       Description: Instruct patient/caregiver on effective coping skills. Intervention: Instruct on strategies to reduce emotional distress     Dates: Start: 08/05/20       Description: Instruct patient/caregiver on strategies to reduce emotional distress. Problem: End of Life Process     Dates: Start: 08/05/20       Disciplines: Interdisciplinary    Goal: Demonstrate understanding of end of life processes     Dates: Start: 08/05/20   Expected End: 08/15/20       Description: Salvador Barton will understand end of life processes.     Disciplines: Interdisciplinary    Intervention: Assess for signs/symptoms of terminal restlessness     Dates: Start: 08/05/20             Intervention: Tierney Travis on strategies to reduce terminal restlessness     Dates: Start: 08/05/20             Intervention: Instruct on the dying process     Dates: Start: 08/05/20             Intervention: Instruct: imminent death     Dates: Start: 08/05/20             Intervention: Instruct: process at end of life     Dates: Start: 08/05/20                         Problem: Falls - Risk of     Dates: Start: 08/04/20       Disciplines: Interdisciplinary    Goal: *Absence of Falls     Dates: Start: 08/04/20   Expected End: 08/14/20       Description: Document Vickii Bridges Fall Risk and appropriate interventions in the flowsheet. Disciplines: Interdisciplinary                Problem: Hospice Orientation     Dates: Start: 08/05/20       Disciplines: Interdisciplinary    Goal: Demonstrate understanding of hospice philosophy, plan of care, and home hospice program     Dates: Start: 08/05/20   Expected End: 08/05/20       Description: The patient/family/caregiver will demonstrate understanding of hospice philosophy, plan of care and the home hospice program as evidenced by participation in meeting the patient's psychosocial, spiritual, medical, and physical needs inclusive of medical supplies/equipment focusing on symptoms. Disciplines: Interdisciplinary    Intervention: Instruct on hospice philosophy     Dates: Start: 08/05/20             Intervention: Instruct: hospice orientation     Dates: Start: 08/05/20             Intervention: Instruct: medical equipment     Dates: Start: 08/05/20       Description: Instruct patient/caregiver on medical equipment and supplies.           Intervention: Instruct: medical power of  and will     Dates: Start: 08/05/20             Intervention: Instruct:terminal illness     Dates: Start: 08/05/20                         Problem: Patient Education: Go to Patient Education Activity     Dates: Start: 08/04/20       Disciplines: Interdisciplinary    Goal: Patient/Family Education     Dates: Start: 08/04/20   Expected End: 08/14/20       Disciplines: Interdisciplinary                Problem: Patient Education: Go to Patient Education Activity     Dates: Start: 08/04/20       Disciplines: Interdisciplinary    Goal: Patient/Family Education     Dates: Start: 08/04/20   Expected End: 08/14/20       Disciplines: Interdisciplinary                Problem: Pressure Injury - Risk of Dates: Start: 08/04/20       Disciplines: Interdisciplinary    Goal: *Prevention of pressure injury     Dates: Start: 08/04/20   Expected End: 08/14/20       Description: Document Dawson Scale and appropriate interventions in the flowsheet. Disciplines: Interdisciplinary                Problem: Spiritual Distress     Dates: Start: 08/05/20       Disciplines: Interdisciplinary    Goal: Distress heard, acknowledged, and addressed     Dates: Start: 08/05/20   Expected End: 08/15/20       Description: Patient/family/caregiver distress will be heard, acknowledged, and addressed throughout hospice care. Disciplines: Interdisciplinary    Intervention: Assess for signs/symptoms of spiritual distress     Dates: Start: 08/05/20             Intervention: Consult on spiritual care     Dates: Start: 08/05/20       Description: Consult to Spiritual Care          Intervention: Discuss strategies to reduce spiritual distress     Dates: Start: 08/05/20       Description: Discuss with patient/caregiver strategies to reduce spiritual distress.                         Care Plan Problems/Goals      Progressing Towards Goal (2)      *Absence of Falls (Falls - Risk of)    Disciplines:  Interdisciplinary Expected end:  08/14/20        Outcome: Progressing Towards Goal By Ami Sampson RN on 08/05/20 0259            *Prevention of pressure injury (Pressure Injury - Risk of)    Disciplines:  Interdisciplinary Expected end:  08/14/20        Outcome: Progressing Towards Goal By Ami Sampson RN on 08/05/20 0259                         No Outcome (8)      Patient/Family Education (Patient Education: Go to Patient Education Activity)    Disciplines:  Interdisciplinary Expected end:  08/14/20          Patient/Family Education (Patient Education: Go to Patient Education Activity)    Disciplines:  Interdisciplinary Expected end:  08/14/20          Demonstrate understanding of hospice philosophy, plan of care, and home hospice program (Hospice Orientation)    Disciplines:  Interdisciplinary Expected end:  08/05/20          Explore reactions to and verbalize acceptance of impending loss (Anticipatory Grief)    Disciplines:  Interdisciplinary Expected end:  08/15/20          Verbalize and demonstrate ability to manage anxiety (Anxiety/Agitation)    Disciplines:  Interdisciplinary Expected end:  08/15/20          Demonstrate acceptance of terminal illness and understanding of disease progression (Coping and Emotional Distress)    Disciplines:  Interdisciplinary Expected end:  08/15/20          Distress heard, acknowledged, and addressed (Spiritual Distress)    Disciplines:  Interdisciplinary Expected end:  08/15/20          Demonstrate understanding of end of life processes (End of Life Process)    Disciplines:  Interdisciplinary Expected end:  08/15/20                            ___________________    Care Team Notes          POC/IDG Notes      [facility] IDG  Notes by Joseph Jara at 08/05/20 0841  Version 1 of 1    Author:  Joseph Jara Service:  Spiritual Care Author Type:  Pastoral Care    Filed:  08/05/20 0843 Date of Service:  08/05/20 0841 Status:  Signed    :  Joseph aJra (Pastoral Care)       Patient: Camden Trujillo    Date: 08/05/20  Time: 8:41 AM    [facility]  Notes    / Grief Counselor has reviewed  Initial Comprehensive Assessment and Initial Plan of Care for Stephan and  is in agreement with the plans put in place and goals set thus far. Spiritual and Bereavement Assessments to be completed and care provided as appropriate.        Signed by: John [facility] IDG  Notes by Jose Shin at 08/05/20 5594  Version 1 of 1    Author:  Jose Shin Service:  Licensed Clinical  Author Type:      Filed:  08/05/20 0829 Date of Service:  08/05/20 0829 Status:  Signed    :  Jose Shin ()       Patient: Dinora Godwin Kj    Date: 08/05/20  Time: 8:29 AM    [facility]  Notes  The volunteer program has been suspended due to the Covid-19 virus. LMSW will ensure the pt and families receive their comfort bags. LMSW has read and agrees with the RN initial assessment. LMSW will meet with pt and family to complete the SW assessment. Signed by: Vincenzo Marquez       [facility] IDG Nurse Notes by Ascencion Lamb RN at 08/04/20 1530  Version 1 of 1    Author:  Ascencion Lamb RN Service:  Hospice and Palliative Care Author Type:  Registered Nurse    Filed:  08/04/20 1914 Date of Service:  08/04/20 1530 Status:  Signed    :  Ascencion Lamb RN (Registered Nurse)       Patient: Robert Franco    Date: 08/04/20  Time: 15:30 PM    [address] Nurse Notes  6402- The patient arrived from [facility] via ambulance for GIP. Diagnosis of Acute Hypoxic and Hypercapnic Respiratory Failure. Symptoms of respiratory distress, agitation / anxiety and pain will be managed with IV or subcutaneous medications. The patient arrived with Bi[employer]ap for transportation and was changed to 10 L of oxygen via oxymizer on arrival. Patient tolerated well. Patient has an IV access in left and right arm. Dressing to both are dry and intact. Farias catheter is draining clear yellow urine. The patient has large green incontinent stool on arrival. Cleaned and creamed alexander area. Skin is intact. He has a dressing to left upper chest from VAT. Dressing is dry and intact. The patient did not respond to the transfer from the stretcher to the bed. Patient's wife, Nicanor Manuel, is at the bedside. Admission is completed and initial General Hospice care plan initiated which includes spiritual, psychosocial and bereavement. Patient has no immediate needs. IDG team made aware of plan of care.            Signed by: Meera Lezama RN                Care Team Present:   Team Members Present: Physician, Nurse, , [address] [place] [address] Road  Physician Team Member: Bhavya Romano MD  Nurse Team Member: Sharri Akers RN  Social Work Team Member: Gurpreet Metzger, [address] [place] [address] Team Member: Alexy Adhikari.  Div

## 2020-08-05 NOTE — [facility] IDG CHAPLAIN NOTES
Patient: Monique Gray    Date: 08/05/20  Time: 8:41 AM    [facility]  Notes    / Grief Counselor has reviewed  Initial Comprehensive Assessment and Initial Plan of Care for Miguel Model and  is in agreement with the plans put in place and goals set thus far. Spiritual and Bereavement Assessments to be completed and care provided as appropriate.        Signed by: Monroe Richmond

## 2020-08-06 NOTE — [facility] IDG CHAPLAIN NOTES
Patient: Flavia Valdez    Date: 08/06/20  Time: 10:09 AM    \[facility]\""  Notes    Intervention: Ministry of presence, Meaningful conversation with wife, Spiritual and Bereavement Assessment,  Prayer. Outcome: Wife was able to share brief bits of their lives together and her love for her . Wife expresses feelings of shock due to her 's sudden illness. Wife expressed appreciation. Patient appeared to hear even though he could not speak. Plan: Continue to provide spiritual and emotional support throughout patient's time with [facility]. Offer rituals as appropriate.          Signed by: Kuldeep Levy

## 2020-08-06 NOTE — PROGRESS NOTES
Received report from [facility], RN. Pt Id by name and . 193  Pt lying in bed with eyes closed. No facial grimace. Flacc =0-1. Resp irreg. Dyspnea on 10 L Oxymizer. Lungs diminished. HR irreg. No BS noted at this time. Skin pale. No mottling noted. Edema 2+ noted in BLE. Farias cath draining eamon urine. SR up x 2. Bed low/locked. Call light with in reach. Door opened.   Peripheral line flushed with 3 ml ns. Flushed well. Pt restless, anxious. Resp labored on 10L oxymizer. Haldol 2 mg IVP given. Incontinent care completed. Large loose green stool noted. Pt repositioned. SR up x 2. Bed low/locked. Call light with in reach. Family at the bedside. 2340  Pt with eyes closed. No facial grimace. No s/sx of distress. Resp irreg non labored . Open mouth breathing on 10 L oxymizer. SR up x 2. Bed low/locked. Call light with in reach. Family at the bedside. 0123  Pt with eyes closed. No facial grimace. No s/sx of distress. Resp irreg non labored . Open mouth breathing on 10 L oxymizer. SR up x 2. Bed low/locked. Call light with in reach. Family at the bedside. 0315  Pt with out HR, Resp, B/P. Spouse sleeping in bed with pt. Spouse tearful, accepting. Appropriate staff notified. Post mortem care completed. Pt has no medications to dispose of at this time. Pt has no jewelry or personal belongings noted. Spouse asking if pt's wedding band and watch was transferred with pt from [facility]. No notes indicated such and nothing locked up in lock box. Call made to [facility]. Was given number for wife to call to inquire. Business office [phone]. Number given to Wife. [employer] notified. Awaiting arrival of [place]. [employer] arrived at . Body removed at 8431.

## 2020-08-06 NOTE — [facility] IDG NURSE NOTES
Patient: Dominguez Hall    Date: 08/06/20  Time: 2:25 PM    [facility] Nurse Notes  1st IDG: Pt is a 79-year-old male with hypoxic respiratory failure who is here GIP level of care for management of pain, dyspnea and agitation. Farias with UOP of 2900 cc. Peripheral IV x 2. Oxygen via Oxymizer at 10 l/min. Wounds: right groin fungal rash. No PO intake. VS stable. PRN medications:  Haldol 2 mg IV x 2 for agitation, Morphine 2 mg IV x 2 for dyspnea. Scheduled meds:  none. Plan:  Start nystatin for fungal rash. Comprehensive plan of care reviewed. IDG and pt./family in agreement with plan of care. The IDG identifies through on-going assessment when a change is needed to the POC; the pt/family will receive care and services necessitated by changes in POC.  Medications reviewed by the pharmacist and Medical Director          Signed by: Maury Muniz RN

## 2020-08-06 NOTE — PROGRESS NOTES
Problem: Falls - Risk of  Goal: *Absence of Falls  Description: Document Radhames Kaur Fall Risk and appropriate interventions in the flowsheet. Outcome: Progressing Towards Goal  Note: Fall Risk Interventions:       Mentation Interventions: Bed/chair exit alarm, Door open when patient unattended, Evaluate medications/consider consulting pharmacy    Medication Interventions: Bed/chair exit alarm, Evaluate medications/consider consulting pharmacy    Elimination Interventions: Bed/chair exit alarm, Call light in reach              Problem: Pressure Injury - Risk of  Goal: *Prevention of pressure injury  Description: Document Dawson Scale and appropriate interventions in the flowsheet. Outcome: Progressing Towards Goal  Note: Pressure Injury Interventions:  Sensory Interventions: Assess changes in LOC, Float heels, Minimize linen layers, Check visual cues for pain, Keep linens dry and wrinkle-free    Moisture Interventions: Absorbent underpads, Minimize layers, Internal/External urinary devices, Apply protective barrier, creams and emollients, Limit adult briefs, Moisture barrier    Activity Interventions: Pressure redistribution bed/mattress(bed type)    Mobility Interventions: Pressure redistribution bed/mattress (bed type), Float heels    Nutrition Interventions: Document food/fluid/supplement intake    Friction and Shear Interventions: Apply protective barrier, creams and emollients, Minimize layers, Lift sheet                Problem: Anxiety/Agitation  Goal: Verbalize and demonstrate ability to manage anxiety  Description: The patient/family/caregiver will verbalize and demonstrate ability to manage the patient's anxiety throughout hospice care.   Outcome: Progressing Towards Goal     Problem: Breathing Pattern - Ineffective  Goal: *PALLIATIVE CARE:  Alleviation of Dyspnea  Outcome: Progressing Towards Goal

## 2020-08-06 NOTE — [facility] IDG MASTER NOTE
[facility]  Date: 08/06/20  Time: 2:26 PM    ___________________    Patient: Bhargavi Irvin  Coverage Information:     Payor: [place]     Plan: [place] MEDICARE PART A AND B     Subscriber ID: [id number]     Phone Number:   MRN: [id number]      Current Benefit Period: Benefit Period 1  Start Date: 8/4/2020  End Date: 11/1/2020        Hospice Attending Provider: Kofi Hardy [address] [place] [address]  [id number]  Phone: [phone]  Fax: [phone]    Level of Care: General Inpatient Care      ___________________    Diagnoses: There were no encounter diagnoses.     Current Medications:    Current Facility-Administered Medications:     nystatin (MYCOSTATIN) 100,000 unit/gram powder, , Topical, BID, Marzolf, Madhavi Ivory, NP    sodium chloride (NS) flush 3 mL, 3 mL, IntraVENous, Q12H, Clarence Priestchild, NP, 3 mL at 08/06/20 0847    sodium chloride (NS) flush 3 mL, 3 mL, IntraVENous, PRN, Clarence Priestchild, NP, 3 mL at 08/06/20 1321    haloperidol lactate (HALDOL) injection 2 mg, 2 mg, SubCUTAneous, Q1H PRN **OR** haloperidol lactate (HALDOL) injection 2 mg, 2 mg, IntraVENous, Q1H PRN, Clarence Priestchild, NP    acetaminophen (TYLENOL) suppository 650 mg, 650 mg, Rectal, Q3H PRN, Clarence Priestchild, NP    bisacodyL (DULCOLAX) suppository 10 mg, 10 mg, Rectal, PRN, Clarence Priestchild, NP    haloperidol lactate (HALDOL) injection 2 mg, 2 mg, SubCUTAneous, Q1H PRN **OR** haloperidol lactate (HALDOL) injection 2 mg, 2 mg, IntraVENous, Q1H PRN, Clarence Priestchild, NP, 2 mg at 08/06/20 1320    glycopyrrolate (ROBINUL) injection 0.2 mg, 0.2 mg, IntraVENous, Q4H PRN, Clarence Priestchild, NP    morphine injection 2 mg, 2 mg, SubCUTAneous, Q20MIN PRN **OR** morphine injection 2 mg, 2 mg, IntraVENous, Q20MIN PRN, Clarence Valdez, NP, 2 mg at 08/05/20 1707    LORazepam (ATIVAN) injection 1 mg, 1 mg, IntraVENous, Q4H PRN, Clarence Valdez, NP    Orders:  Orders Placed This Encounter    IP CONSULT TO SPIRITUAL CARE Once on week one, then PRN. For [facility] patients only. For contracted patients, primary hospice will continue to manage spiritual care needs     Once on week one, then PRN. For [facility] patients only. For contracted patients, primary hospice will continue to manage spiritual care needs     Standing Status:   Standing     Number of Occurrences:   1     Order Specific Question:   Reason for Consult: Answer:   Spiritual crisis intervention or per patient or caregiver request    DIET PLEASURE     Standing Status:   Standing     Number of Occurrences:   1     Order Specific Question:   Likes/Dislikes/Preferences     Answer:   hold    VITAL SIGNS     Standing Status:   Standing     Number of Occurrences:   1    VITAL SIGNS     Standing Status:   Standing     Number of Occurrences:   1    NURSING-MISCELLANEOUS: comfort measures Enter comfort measures above. CONTINUOUS     Enter comfort measures above. Standing Status:   Standing     Number of Occurrences:   1     Order Specific Question:   Description of Order:     Answer:   comfort measures    PLAZA CATHETER, CARE     1. Plaza Catheter care every shift and PRN  2. Notify Physician of Plaza Catheter leakage, occlusion, gross adherent sediment or accidental removal  3. Change Plaza 30 days after insertion. 4. May flush catheter prn leakage or gross adherent sediment or mucus. Standing Status:   Standing     Number of Occurrences:   1    BLADDER CHECKS     May scan bladder PRN for urinary retention and or patient discomfort     Standing Status:   Standing     Number of Occurrences:   1    NURSING ASSESSMENT:  SPECIFY Assess for GIP, routine, or respite level of care. Q SHIFT Routine     Standing Status:   Standing     Number of Occurrences:   1     Order Specific Question:   Please describe the test or procedure you would like to order. Answer:   Assess for GIP, routine, or respite level of care.     PAIN ASSESSMENT Pain and Symptoms: Assess ever 4 hours and PRN, for [facility] level of care. PRN Routine     Standing Status:   Standing     Number of Occurrences:   1     Order Specific Question:   Please describe the test or procedure you would like to order. Answer:   Pain and Symptoms: Assess ever 4 hours and PRN, for [facility] level of care.  BEDREST, COMPLETE     Standing Status:   Standing     Number of Occurrences:   1    NURSING-MISCELLANEOUS: admit 8/4: (Mini) Admitted GIP with Acute Hypoxic and Hypercapnic Respiratory Failure for management of pain, dyspnea and agitation. Associated Dx: Empyema Sterile, Aspiration Pneumonia, Dysphagia, Protein Calorie Malnutri. .. 8/4: (Mini) Admitted GIP with Acute Hypoxic and Hypercapnic Respiratory Failure for management of pain, dyspnea and agitation. Associated Dx: Empyema Sterile, Aspiration Pneumonia, Dysphagia, Protein Calorie Malnutrition, Hypoalbuminemia, Metabolic Encephalopathy, Mucous Plugging, Non-traumatic Intracerebral Hemorrhage, Anemia of Chronic Disease, Acute L Ventricular Systolic Dysfunction CHF, Chest tube   Non-Associated Dx: BPH, HTN, Osteoarthritis     Standing Status:   Standing     Number of Occurrences:   1     Order Specific Question:   Description of Order:     Answer:   admit    DO NOT RESUSCITATE     Standing Status:   Standing     Number of Occurrences:   1    OXYGEN CANNULA Liters per minute: 10; Indications for O2 therapy: RESPIRATORY DISTRESS PRN Routine     Please give via oxymizer. Standing Status:   Standing     Number of Occurrences:   71600     Order Specific Question:   Liters per minute:      Answer:   10     Order Specific Question:   Indications for O2 therapy     Answer:   RESPIRATORY DISTRESS    sodium chloride (NS) flush 3 mL    sodium chloride (NS) flush 3 mL    OR Linked Order Group     haloperidol lactate (HALDOL) injection 2 mg     haloperidol lactate (HALDOL) injection 2 mg    acetaminophen (TYLENOL) suppository 650 mg    bisacodyL (DULCOLAX) suppository 10 mg    OR Linked Order Group     haloperidol lactate (HALDOL) injection 2 mg     haloperidol lactate (HALDOL) injection 2 mg    glycopyrrolate (ROBINUL) injection 0.2 mg    OR Linked Order Group     morphine injection 2 mg     morphine injection 2 mg    LORazepam (ATIVAN) injection 1 mg    nystatin (MYCOSTATIN) 100,000 unit/gram powder    INITIAL PHYSICIAN ORDER: HOSPICE Level Of Care: General Inpatient; Reason for Admission: 8/4: (Miin) Admitted GIP with Acute Hypoxic and Hypercapnic Respiratory Failure for management of pain, dyspnea and agitation. Standing Status:   Standing     Number of Occurrences:   1     Order Specific Question:   Status     Answer:   Hospice     Order Specific Question:   Level Of Care     Answer:   General Inpatient     Order Specific Question:   Reason for Admission     Answer:   8/4: (Mini) Admitted GIP with Acute Hypoxic and Hypercapnic Respiratory Failure for management of pain, dyspnea and agitation. Order Specific Question:   Inpatient Hospitalization Certified Necessary for the Following Reasons     Answer:   3. Patient receiving treatment that can only be provided in an inpatient setting (further clarification in H&P documentation)     Order Specific Question:   Admitting Diagnosis     Answer:   Respiratory failure with hypoxia and hypercapnia [facility]) [[id number]]     Order Specific Question:   Terminal Prognosis Diagnosis(es)     Answer:   Respiratory failure with hypoxia and hypercapnia [facility]) [[id number]]     Order Specific Question:   Admitting Physician     Answer:   Iqra Griffin [[id number]]     Order Specific Question:   Attending Physician     Answer:   Demetris Huynh     Order Specific Question:   Discharge Plan:     Answer:    Other (Specify)    IP CONSULT TO SOCIAL WORK     Once on week one, then PRN for Psychosocial crisis intervention or per patient or caregiver request.  For [facility] patients only. For contracted patients, primary hospice will continue to manage social work needs. Standing Status:   Standing     Number of Occurrences:   1     Order Specific Question:   Reason for Consult: Answer: Once on week one, then PRN for Psychosocial crisis intervention or per patient or caregiver request.       Allergies: Allergies   Allergen Reactions    Duratuss [Pseudoephedrine-Guaifenesin] Unknown (comments)     Pt denies reaction/ allergy (2020)    Erythromycin Unknown (comments)     Pt denies reaction/ allergy (2020)       Care Plan:  Multidisciplinary Problems (Active)     Problem: Anticipatory Grief     Dates: Start: 08/05/20       Disciplines: Interdisciplinary    Goal: Explore reactions to and verbalize acceptance of impending loss     Dates: Start: 08/05/20   Expected End: 08/15/20       Description: Patient/family/caregiver will explore reactions to and verbalize acceptance of impending loss. Disciplines: Interdisciplinary    Intervention: Assess grief responses     Dates: Start: 08/05/20             Intervention: Assist with grief counseling     Dates: Start: 08/05/20       Description:  to assist.          Intervention: Yasmeen Mcgraw on stages of grief     Dates: Start: 08/05/20       Description: Instruct patient/caregiver on stages of grief. Intervention: Offer grief support group     Dates: Start: 08/05/20       Description: Patient/family/caregiver will explore reactions to and verbalize acceptance of impending loss. Problem: Anticipatory Grief     Dates: Start: 08/06/20       Description: Patient/ Family will acknowledge feelings of anticipatory grief.      Disciplines: Interdisciplinary    Goal: Grief heard and acknowledged, anxiety reduced, patient coping identified, patient/family expressed gratitude     Dates: Start: 08/06/20   Expected End: 08/20/20       Description: Patient/ Family will continue to share feelings of anxiety around their grief journey. Family will utilize available resources to help them cope with changes and anticipated death. Disciplines: Interdisciplinary    Intervention: Assess grief responses     Dates: Start: 08/06/20       Description: Loras Expose will assess for grief reactions with each visit.  will provide a safe space for open communication around feelings associated with loss. Intervention: Support grieving process     Dates: Start: 08/06/20       Description: [facility] will support the grief process during routine visits with active listening, affirmation of feelings and the utilization of material on what one feels before the death.  will attempt to normalize those feelings. Problem: Anxiety/Agitation     Dates: Start: 08/05/20       Disciplines: Interdisciplinary    Goal: Verbalize and demonstrate ability to manage anxiety     Dates: Start: 08/05/20   Expected End: 08/15/20       Description: The patient/family/caregiver will verbalize and demonstrate ability to manage the patient's anxiety throughout hospice care. Disciplines: Interdisciplinary    Intervention: Assess for anxiety/agitation     Dates: Start: 08/05/20       Description: Assess for signs and symptoms of anxiety and agitation. Intervention: Instruction strategies to reduce anxiety/agitation     Dates: Start: 08/05/20       Description: Instruct patient/caregiver on strategies to reduce anxiety/agitation.                       Problem: Breathing Pattern - Ineffective     Dates: Start: 08/05/20       Disciplines: Nurse, Interdisciplinary, RT    Goal: *PALLIATIVE CARE:  Alleviation of Dyspnea     Dates: Start: 08/05/20   Expected End: 08/12/20       Disciplines: Nurse, Interdisciplinary, RT    Intervention: Refer patient for holistic services per facility     Dates: Start: 08/05/20             Intervention: Administer analgesics as needed     Description: Problem: Coping and Emotional Distress     Dates: Start: 08/05/20       Disciplines: Interdisciplinary    Goal: Demonstrate acceptance of terminal illness and understanding of disease progression     Dates: Start: 08/05/20   Expected End: 08/15/20       Description: Patient/family/caregiver will demonstrate acceptance of terminal disease and understanding of disease progression while employing appropriate coping mechanisms. Disciplines: Interdisciplinary    Intervention: Assess for alteration in coping     Dates: Start: 08/05/20             Intervention: Assess for signs/symptoms of emotional distress     Dates: Start: 08/05/20             Intervention: Instruct on effective coping skills     Dates: Start: 08/05/20       Description: Instruct patient/caregiver on effective coping skills. Intervention: Instruct on strategies to reduce emotional distress     Dates: Start: 08/05/20       Description: Instruct patient/caregiver on strategies to reduce emotional distress. Problem: End of Life Process     Dates: Start: 08/05/20       Disciplines: Interdisciplinary    Goal: Demonstrate understanding of end of life processes     Dates: Start: 08/05/20   Expected End: 08/15/20       Description: Dragan Murry will understand end of life processes.     Disciplines: Interdisciplinary    Intervention: Assess for signs/symptoms of terminal restlessness     Dates: Start: 08/05/20             Intervention: Jesusita Sorto on strategies to reduce terminal restlessness     Dates: Start: 08/05/20             Intervention: Instruct on the dying process     Dates: Start: 08/05/20             Intervention: Instruct: imminent death     Dates: Start: 08/05/20             Intervention: Instruct: process at end of life     Dates: Start: 08/05/20                         Problem: Falls - Risk of     Dates: Start: 08/04/20       Disciplines: Interdisciplinary    Goal: *Absence of Falls     Dates: Start: 08/04/20 Expected End: 08/14/20       Description: Document Ki Stade Fall Risk and appropriate interventions in the flowsheet. Disciplines: Interdisciplinary                Problem: Patient Education: Go to Patient Education Activity     Dates: Start: 08/04/20       Disciplines: Interdisciplinary    Goal: Patient/Family Education     Dates: Start: 08/04/20   Expected End: 08/14/20       Disciplines: Interdisciplinary                Problem: Patient Education: Go to Patient Education Activity     Dates: Start: 08/04/20       Disciplines: Interdisciplinary    Goal: Patient/Family Education     Dates: Start: 08/04/20   Expected End: 08/14/20       Disciplines: Interdisciplinary                Problem: Pressure Injury - Risk of     Dates: Start: 08/04/20       Disciplines: Interdisciplinary    Goal: *Prevention of pressure injury     Dates: Start: 08/04/20   Expected End: 08/14/20       Description: Document Dawson Scale and appropriate interventions in the flowsheet. Disciplines: Interdisciplinary                Problem: Psychosocial General     Dates: Start: 08/05/20       Description: Problem: SW: Patient Standard/ Initial Plan of Care-Social Work    Goals: 1. Patient and family will experience a smooth and safe transition to hospice care   2. Long Term Goal: To minimize pain and discomfort while maximizing quality of life for the \  patient and providing support for the family /patient caregiver. Intervention: 1. Social Work Assessment            2. Assess patient /family/ caregiver expectations from hospice. 3. Assess desires regarding end-of-life issues. 4. Assess degrees and stages of grief. 5. Assessment of pain and coping at every visit. 6. Assess the patient psycho-social, emotional cultural implications of pain. 7. Assess mental/ emotional status and need for additional resources every visit.             8. Assess PCG anxiety and fatigue in care plan delivery.           Disciplines: Interdisciplinary    Goal: Patient/family is receiving emotional support     Dates: Start: 08/05/20   Expected End: 08/14/20       Description: Problem:  Anticipatory Grief    Goals:   Patient will share feelings regarding terminal conditions   Patient will participate in life review  Family will discuss feelings of anticipatory grief and develop coping skills      Interventions:   Allow expression of feelings and losses  Encourage communication and acceptance  Instruct in stages of grief  Encourage life review  Offer support through grief process  Referral for Bereavement            Disciplines: Interdisciplinary                  Care Plan Problems/Goals      Progressing Towards Goal (6)      *Absence of Falls (Falls - Risk of)    Disciplines:  Interdisciplinary Expected end:  08/14/20        Outcome: Progressing Towards Goal By Musa Roberto RN on 08/06/20 1001            *Prevention of pressure injury (Pressure Injury - Risk of)    Disciplines:  Interdisciplinary Expected end:  08/14/20        Outcome: Progressing Towards Goal By Musa Roberto RN on 08/06/20 1001            Verbalize and demonstrate ability to manage anxiety (Anxiety/Agitation)    Disciplines:  Interdisciplinary Expected end:  08/15/20        Outcome: Progressing Towards Goal By Musa Roberto RN on 08/06/20 1001            Patient/family is receiving emotional support (Psychosocial General)    Disciplines:  Interdisciplinary Expected end:  08/14/20        Outcome: Progressing Towards Goal By Too Zarate on 08/05/20 1025            *PALLIATIVE CARE:  Alleviation of Dyspnea (Breathing Pattern - Ineffective)    Disciplines:  Nurse, Interdisciplinary, RT Expected end:  08/12/20        Outcome: Progressing Towards Goal By Musa Roberto RN on 08/06/20 1001            Grief heard and acknowledged, anxiety reduced, patient coping identified, patient/family expressed gratitude (Anticipatory Grief) Disciplines:  Interdisciplinary Expected end:  08/20/20        Outcome: Progressing Towards Goal By Maddie Marmolejo on 08/06/20 0937                         No Outcome (5)      Patient/Family Education (Patient Education: Go to Patient Education Activity)    Disciplines:  Interdisciplinary Expected end:  08/14/20          Patient/Family Education (Patient Education: Go to Patient Education Activity)    Disciplines:  Interdisciplinary Expected end:  08/14/20          Explore reactions to and verbalize acceptance of impending loss (Anticipatory Grief)    Disciplines:  Interdisciplinary Expected end:  08/15/20          Demonstrate acceptance of terminal illness and understanding of disease progression (Coping and Emotional Distress)    Disciplines:  Interdisciplinary Expected end:  08/15/20          Demonstrate understanding of end of life processes (End of Life Process)    Disciplines:  Interdisciplinary Expected end:  08/15/20                       Resolved/Met (2)      Demonstrate understanding of hospice philosophy, plan of care, and home hospice program (Hospice Orientation)    Disciplines:  Interdisciplinary Expected end:  08/05/20        Outcome: Resolved/Met By Cami Espinosa RN on 08/05/20 1535            Distress heard, acknowledged, and addressed (Spiritual Distress)    Disciplines:  Interdisciplinary Expected end:  08/15/20        Outcome: Resolved/Met By Maddie Marmolejo on 08/06/20 0937                              ___________________    Care Team Notes          POC/IDG Notes      [facility] IDG Nurse Notes by Johana Rodriguez RN at 08/06/20 1425  Version 1 of 1    Author:  Johana Rodriguez RN Service:  Hospice and Palliative Care Author Type:  Registered Nurse    Filed:  08/06/20 1425 Date of Service:  08/06/20 1425 Status:  Signed    :  Johana Rodriguez RN (Registered Nurse)       Patient: Dominguez Hall    Date: 08/06/20  Time: 2:25 PM    [address] [place] [address] Nurse Notes  1st IDG: Pt is a 79-year-old male with hypoxic respiratory failure who is here GIP level of care for management of pain, dyspnea and agitation. Farias with UOP of 2900 cc. Peripheral IV x 2. Oxygen via Oxymizer at 10 l/min. Wounds: right groin fungal rash. No PO intake. VS stable. PRN medications:  Haldol 2 mg IV x 2 for agitation, Morphine 2 mg IV x 2 for dyspnea. Scheduled meds:  none. Plan:  Start nystatin for fungal rash. Comprehensive plan of care reviewed. IDG and pt./family in agreement with plan of care. The IDG identifies through on-going assessment when a change is needed to the POC; the pt/family will receive care and services necessitated by changes in POC. Medications reviewed by the pharmacist and Medical Director          Signed by: Sharri Khalil RN       [address] [facility]  Notes by Jacquelin Recio at 08/06/20 1009  Version 1 of 1    Author:  Jacquelin Recio Service:  Spiritual Care Author Type:  Pastoral Care    Filed:  08/06/20 1348 Date of Service:  08/06/20 1009 Status:  Signed    :  Jacquelin Recio (Pastoral Care)       Patient: Robert Franco    Date: 08/06/20  Time: 10:09 AM    [facility]  Notes    Intervention: Ministry of presence, Meaningful conversation with wife, Spiritual and Bereavement Assessment,  Prayer. Outcome: Wife was able to share brief bits of their lives together and her love for her . Wife expresses feelings of shock due to her 's sudden illness. Wife expressed appreciation. Patient appeared to hear even though he could not speak. Plan: Continue to provide spiritual and emotional support throughout patient's time with [facility]. Offer rituals as appropriate.          Signed by: Holli Campbell       [address] [facility]  Notes by Reji Baker at 08/05/20 1100  Version 1 of 1    Author:  Reji Baker Service:  Licensed Clinical  Author Type:      Filed:  08/05/20 1102 Date of Service:  08/05/20 1100 Status:  Signed    :  Gupta Hands, Marly Petersen ()       Patient: Bebeto Savage    Date: 08/05/20  Time: 11:00 AM    [facility]  Notes  LMSW has met with the pt and family and completed the SW initial assessment. LMSW made a referral to [address] for the spouse. She seems shocked at his decline and the rapidness of his illness. Pt was independent before this hospital stay. LMSW will continue to follow the pt and family for emotional support and active listening. Comfort bag has been provided to the family. Signed by: Gabriele Castillo       [facility]  Notes by Thomas Rogers at 08/05/20 0841  Version 1 of 1    Author:  Thomas Rogers Service:  Spiritual Care Author Type:  Pastoral Care    Filed:  08/05/20 0843 Date of Service:  08/05/20 0841 Status:  Signed    :  Thomas Rogers (Pastoral Care)       Patient: Bebeto Savage    Date: 08/05/20  Time: 8:41 AM    [facility]  Notes    / Grief Counselor has reviewed  Initial Comprehensive Assessment and Initial Plan of Care for Stephan and  is in agreement with the plans put in place and goals set thus far. Spiritual and Bereavement Assessments to be completed and care provided as appropriate. Signed by: Saman [facility] IDG  Notes by Mary Adan at 08/05/20 8768  Version 1 of 1    Author:  Mary Adan Service:  Licensed Clinical  Author Type:      Filed:  08/05/20 0829 Date of Service:  08/05/20 0829 Status:  Signed    :  Mary Adan ()       Patient: Bebeto Savage    Date: 08/05/20  Time: 8:29 AM    [facility]  Notes  The volunteer program has been suspended due to the Covid-19 virus. LMSW will ensure the pt and families receive their comfort bags. LMSW has read and agrees with the RN initial assessment. LMSW will meet with pt and family to complete the SW assessment.             Signed by: Gabriele Castillo       [facility] Nurse Notes by Renee Tapia RN at 08/04/20 1530  Version 1 of 1    Author:  Renee Tapia RN Service:  Hospice and Palliative Care Author Type:  Registered Nurse    Filed:  08/04/20 1914 Date of Service:  08/04/20 1530 Status:  Signed    :  Renee Tapia RN (Registered Nurse)       Patient: Camden Trujillo    Date: 08/04/20  Time: 15:30 PM    [facility] Nurse Notes  8258- The patient arrived from [facility] via ambulance for GIP. Diagnosis of Acute Hypoxic and Hypercapnic Respiratory Failure. Symptoms of respiratory distress, agitation / anxiety and pain will be managed with IV or subcutaneous medications. The patient arrived with BiPap for transportation and was changed to 10 L of oxygen via oxymizer on arrival. Patient tolerated well. Patient has an IV access in left and right arm. Dressing to both are dry and intact. Farias catheter is draining clear yellow urine. The patient has large green incontinent stool on arrival. Cleaned and creamed alexander area. Skin is intact. He has a dressing to left upper chest from VAT. Dressing is dry and intact. The patient did not respond to the transfer from the stretcher to the bed. Patient's wife, Niurka Price, is at the bedside. Admission is completed and initial [facility] Hospice care plan initiated which includes spiritual, psychosocial and bereavement. Patient has no immediate needs. IDG team made aware of plan of care. Signed by: Namita Strickland RN                Care Team Present:   Team Members Present: (see sign in sheet for attendees)  Physician Team Member: Valorie Song MD  Nurse Team Member: Fan Luque RN  Social Work Team Member: Odalys Streeter, [address] [place] [address] Team Member: Brie Mcgee.  Div

## 2020-08-06 NOTE — PROGRESS NOTES
Background:  Mr. Lord Cheo Guzmán  is a 75year old gentleman who according to his wife Marjan Cramer has been healthy all his life. She says he has never even been in a hospital up until now. Tiburcio Marshall have been  for 41 years. They enjoy the outdoors. Marjan Cramer says her  enjoys nature and especially likes to fly fish. They enjoy working with their 4 horses. Cindy rides and he helps take care of them. Two of the horses are on their property and two are boarded. They have two dogs. They love to travel. They both are Christians and members of [employer] [address] in [facility]. They recently have had a change in .  They continue to stay in touch with their former .  Gurvinder Sullivan and his wife both retired. 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 Assessment:   Beckie Rangel is lying quietly with his eyes open with no visible sign of distress or pain. He appears to be very calm. He occasionally smiles. From his eyes movement it appears he may be understanding what is said to him. His wife Marjan Cramer is by his side. Cindy shared bit of their story. She continues to be surprised that Beckie Rangel is so sick so suddenly given he has been healthy all his life. She affectionately looks at him throughout the conversation as she is holding his hand. She spoke of their [facility] in [place] and how special the  is to them. The  recently resigned but they are staying in touch with her. Marjan Cramer said she may call the  and hold the phone up to Armani's ear so she can talk with him.  affirmed that idea. During this visit  affirmed their love for each other and for God. Provided a safe space for life review. Offered words of comfort and prayer. Assured Cindy and her  of continued support from [facility].      Plan:  Continue to provide spiritual and emotional support throughout patient's time with [facility]. Offer continued opportunity for open communication and spiritual rituals as appropriate. Focus on Living the present as well as the grief one feels before the loss.  has already provided the booklet, Gone From My Sight.

## 2020-08-06 NOTE — PROGRESS NOTES
Problem: Anxiety/Agitation  Goal: Verbalize and demonstrate ability to manage anxiety  Description: The patient/family/caregiver will verbalize and demonstrate ability to manage the patient's anxiety throughout hospice care.   Outcome: Progressing Towards Goal  Haldol 2 mg SQ q 1 hour prn agitation  Ativan 1 mg IV/IM q 4 hours as needed agitation/anxiety     Problem: Breathing Pattern - Ineffective  Goal: *PALLIATIVE CARE:  Alleviation of Dyspnea  Outcome: Progressing Towards Goal  Morphine 2mg IV/SQ q 20 minutes prn dyspnea

## 2020-08-06 NOTE — PROGRESS NOTES
Report received from off-going nurse,Rebecca Burnett RN  visual identification made, assumed care of pt. Pt resting quietly with eyes closed, no agitation or restlessness, no grimacing or groaning. Pt respirations unlabored. Tab alert in place, rails up x 2, bed in lowest position, safety maintained. FLACC 0. Wife lying on bed.     0855 pt resting quietly, flushed bilateral iv, both intact. Physical assessment completed. Lung sounds absent in left and diminished in right mid and lower lobe, RR 26, heart sounds bounding and regular, absent bowel sounds, ruelas draining yellow urine with sediment. Extremities warm with palpable pulses to all extremities, 1+ edema to lower extremities. 1000 pt resting quietly with eyes closed, no grimacing, groaning or agitation    1100 pt wife, Kevin Cramer, states pt is more alert and responsive today as compared to yesterday. She states she doesn't know what to think and she states he knows he will not recover but she's not sure why he looks like himself and he looks better every day. Discussed prn medication, she state he should not get morphine because he had a bad reaction to narcotics and had terrible hallucinations. 1213 pt wife, Kevin Cramer, at bedside. Pt alert and focused, Cindy stepped out of the room a minute and Nystatin powder applied to perineum    1256 pt alert, his eyes open, head of bed elevated. No grimacing, groaning or agitation. Stacey, used call bell, pt restless, agitated, trying to pull out IV and take off oxymizer, administered haldol IVP    1501 pt resting with eyes open, wife, Kevin Cramer in bed next to him. Pt holding Cindy's hands    1625 pt agitated, restless, attempting to pull out IV, pulling on oxymizer, administered haldol IVP. Pt continued to be agitated pulling on IV. Administered ativan IVP for anxiety.      1700 pt resting quietly, no grimacing, groaning or agitation     1721 administered Nystatin powder to perineum, emptied ruelas of 850 cc clear yellow urine.  Pt continues to resting quietly, no grimacing, groaning or agitation    Report given to on coming nurse Maren Collins RN

## 2020-08-06 NOTE — PROGRESS NOTES
Problem: Spiritual Distress  Goal: Distress heard, acknowledged, and addressed  Description: Patient/family/caregiver distress will be heard, acknowledged, and addressed throughout hospice care. Outcome: Resolved/Met  Variance Other (add note)  Note: No spiritual distress noted. Patient is confident in his Smiley     Problem: Anticipatory Grief  Goal: Grief heard and acknowledged, anxiety reduced, patient coping identified, patient/family expressed gratitude  Description: Patient/ Family will continue to share feelings of anxiety around their grief journey. Family will utilize available resources to help them cope with changes and anticipated death.    Outcome: Progressing Towards Goal

## 2020-08-07 PROBLEM — D72.829 LEUKOCYTOSIS: Status: ACTIVE | Noted: 2020-01-01

## 2020-08-07 PROBLEM — J18.9 PARAPNEUMONIC EFFUSION: Status: ACTIVE | Noted: 2020-01-01

## 2020-08-07 PROBLEM — J91.8 PARAPNEUMONIC EFFUSION: Status: ACTIVE | Noted: 2020-01-01

## 2020-08-07 PROBLEM — G93.41 METABOLIC ENCEPHALOPATHY: Status: ACTIVE | Noted: 2020-01-01

## 2020-08-07 PROBLEM — R13.12 OROPHARYNGEAL DYSPHAGIA: Status: ACTIVE | Noted: 2020-01-01

## 2020-08-07 PROBLEM — J18.9 PNEUMONIA DUE TO INFECTIOUS ORGANISM: Status: ACTIVE | Noted: 2020-01-01

## 2020-08-07 PROBLEM — R53.1 WEAKNESS: Status: ACTIVE | Noted: 2020-01-01

## 2020-08-07 PROBLEM — T17.900A SILENT ASPIRATION: Status: ACTIVE | Noted: 2020-01-01

## 2020-08-07 PROBLEM — E87.1 HYPONATREMIA: Status: ACTIVE | Noted: 2020-01-01

## 2020-12-14 NOTE — [facility] IDG SOCIAL WORKER NOTES
Detail Level: Detailed
Patient: Gus Felder    Date: 08/05/20  Time: 11:00 AM    \[facility]\""  Notes  LMSW has met with the pt and family and completed the  initial assessment. LMSW made a referral to [employer] for the spouse. She seems shocked at his decline and the rapidness of his illness. Pt was independent before this hospital stay. LMSW will continue to follow the pt and family for emotional support and active listening. Comfort bag has been provided to the family.      Signed by: Diogenes Paniagua
Patient: Janes Salazar    Date: 08/05/20  Time: 8:29 AM    [facility]  Notes  The volunteer program has been suspended due to the Covid-19 virus. LMSW will ensure the pt and families receive their comfort bags. LMSW has read and agrees with the RN initial assessment. LMSW will meet with pt and family to complete the SW assessment.             Signed by: Veronika Granado
Add 87787 Cpt? (Important Note: In 2017 The Use Of 72230 Is Being Tracked By Cms To Determine Future Global Period Reimbursement For Global Periods): yes
Wound Evaluated By: Martinez Jones PA-C

## (undated) DEVICE — SINGLE USE BIOPSY VALVE MAJ-210: Brand: SINGLE USE BIOPSY VALVE (STERILE)

## (undated) DEVICE — KENDALL RADIOLUCENT FOAM MONITORING ELECTRODE RECTANGULAR SHAPE: Brand: KENDALL

## (undated) DEVICE — SINGLE USE SUCTION VALVE MAJ-209: Brand: SINGLE USE SUCTION VALVE (STERILE)

## (undated) DEVICE — MOUTHPIECE ENDOSCP 20X27MM --

## (undated) DEVICE — BRONCHOSCOPY PACK: Brand: MEDLINE INDUSTRIES, INC.

## (undated) DEVICE — SYR 50ML SLIP TIP NSAF LF STRL --